# Patient Record
Sex: FEMALE | Race: WHITE | NOT HISPANIC OR LATINO
[De-identification: names, ages, dates, MRNs, and addresses within clinical notes are randomized per-mention and may not be internally consistent; named-entity substitution may affect disease eponyms.]

---

## 2017-04-28 ENCOUNTER — APPOINTMENT (OUTPATIENT)
Dept: ORTHOPEDIC SURGERY | Facility: CLINIC | Age: 77
End: 2017-04-28

## 2017-04-28 VITALS
HEIGHT: 64 IN | BODY MASS INDEX: 27.66 KG/M2 | WEIGHT: 162 LBS | SYSTOLIC BLOOD PRESSURE: 115 MMHG | DIASTOLIC BLOOD PRESSURE: 76 MMHG | HEART RATE: 93 BPM

## 2017-04-28 RX ORDER — DICLOFENAC SODIUM 75 MG/1
75 TABLET, DELAYED RELEASE ORAL TWICE DAILY
Qty: 60 | Refills: 0 | Status: ACTIVE | COMMUNITY
Start: 2017-04-28 | End: 1900-01-01

## 2017-05-31 ENCOUNTER — APPOINTMENT (OUTPATIENT)
Dept: ORTHOPEDIC SURGERY | Facility: CLINIC | Age: 77
End: 2017-05-31

## 2017-05-31 RX ORDER — DICLOFENAC SODIUM 50 MG/1
50 TABLET, DELAYED RELEASE ORAL TWICE DAILY
Qty: 60 | Refills: 0 | Status: ACTIVE | COMMUNITY
Start: 2017-05-31 | End: 1900-01-01

## 2017-07-05 ENCOUNTER — APPOINTMENT (OUTPATIENT)
Dept: ORTHOPEDIC SURGERY | Facility: CLINIC | Age: 77
End: 2017-07-05

## 2017-08-10 ENCOUNTER — APPOINTMENT (OUTPATIENT)
Dept: ORTHOPEDIC SURGERY | Facility: CLINIC | Age: 77
End: 2017-08-10
Payer: MEDICARE

## 2017-08-10 VITALS
WEIGHT: 167 LBS | HEIGHT: 63 IN | DIASTOLIC BLOOD PRESSURE: 78 MMHG | BODY MASS INDEX: 29.59 KG/M2 | HEART RATE: 75 BPM | SYSTOLIC BLOOD PRESSURE: 125 MMHG

## 2017-08-10 PROCEDURE — 73564 X-RAY EXAM KNEE 4 OR MORE: CPT | Mod: 50

## 2017-08-10 PROCEDURE — 99213 OFFICE O/P EST LOW 20 MIN: CPT

## 2017-08-10 RX ORDER — MELOXICAM 15 MG/1
15 TABLET ORAL DAILY
Qty: 30 | Refills: 0 | Status: ACTIVE | COMMUNITY
Start: 2017-08-10 | End: 1900-01-01

## 2017-08-10 RX ORDER — SITAGLIPTIN AND METFORMIN HYDROCHLORIDE 50; 1000 MG/1; MG/1
TABLET, FILM COATED ORAL
Refills: 0 | Status: ACTIVE | COMMUNITY

## 2017-08-10 RX ORDER — CANAGLIFLOZIN 300 MG/1
TABLET, FILM COATED ORAL
Refills: 0 | Status: ACTIVE | COMMUNITY

## 2018-04-18 ENCOUNTER — RESULT REVIEW (OUTPATIENT)
Age: 78
End: 2018-04-18

## 2018-06-22 ENCOUNTER — INPATIENT (INPATIENT)
Facility: HOSPITAL | Age: 78
LOS: 2 days | Discharge: HOME | DRG: 552 | End: 2018-06-25
Attending: INTERNAL MEDICINE | Admitting: INTERNAL MEDICINE
Payer: MEDICARE

## 2018-06-22 VITALS
TEMPERATURE: 98 F | SYSTOLIC BLOOD PRESSURE: 141 MMHG | RESPIRATION RATE: 18 BRPM | OXYGEN SATURATION: 97 % | DIASTOLIC BLOOD PRESSURE: 67 MMHG | HEART RATE: 78 BPM

## 2018-06-22 PROCEDURE — 99285 EMERGENCY DEPT VISIT HI MDM: CPT | Mod: 25

## 2018-06-23 DIAGNOSIS — M54.5 LOW BACK PAIN: ICD-10-CM

## 2018-06-23 LAB
ANION GAP SERPL CALC-SCNC: 17 MMOL/L — SIGNIFICANT CHANGE UP (ref 5–17)
ANION GAP SERPL CALC-SCNC: 17 MMOL/L — SIGNIFICANT CHANGE UP (ref 5–17)
APPEARANCE UR: CLEAR — SIGNIFICANT CHANGE UP
BASOPHILS # BLD AUTO: 0.1 K/UL — SIGNIFICANT CHANGE UP (ref 0–0.2)
BASOPHILS NFR BLD AUTO: 0.4 % — SIGNIFICANT CHANGE UP (ref 0–2)
BILIRUB UR-MCNC: NEGATIVE — SIGNIFICANT CHANGE UP
BUN SERPL-MCNC: 28 MG/DL — HIGH (ref 7–23)
BUN SERPL-MCNC: 30 MG/DL — HIGH (ref 7–23)
CALCIUM SERPL-MCNC: 9.1 MG/DL — SIGNIFICANT CHANGE UP (ref 8.4–10.5)
CALCIUM SERPL-MCNC: 9.5 MG/DL — SIGNIFICANT CHANGE UP (ref 8.4–10.5)
CHLORIDE SERPL-SCNC: 81 MMOL/L — LOW (ref 96–108)
CHLORIDE SERPL-SCNC: 82 MMOL/L — LOW (ref 96–108)
CK MB CFR SERPL CALC: 2.2 NG/ML — SIGNIFICANT CHANGE UP (ref 0–3.8)
CK SERPL-CCNC: 63 U/L — SIGNIFICANT CHANGE UP (ref 25–170)
CO2 SERPL-SCNC: 24 MMOL/L — SIGNIFICANT CHANGE UP (ref 22–31)
CO2 SERPL-SCNC: 24 MMOL/L — SIGNIFICANT CHANGE UP (ref 22–31)
COLOR SPEC: SIGNIFICANT CHANGE UP
CREAT SERPL-MCNC: 0.97 MG/DL — SIGNIFICANT CHANGE UP (ref 0.5–1.3)
CREAT SERPL-MCNC: 0.99 MG/DL — SIGNIFICANT CHANGE UP (ref 0.5–1.3)
DIFF PNL FLD: NEGATIVE — SIGNIFICANT CHANGE UP
EOSINOPHIL # BLD AUTO: 0.3 K/UL — SIGNIFICANT CHANGE UP (ref 0–0.5)
EOSINOPHIL NFR BLD AUTO: 2.5 % — SIGNIFICANT CHANGE UP (ref 0–6)
ERYTHROCYTE [SEDIMENTATION RATE] IN BLOOD: 44 MM/HR — HIGH (ref 0–20)
GLUCOSE BLDC GLUCOMTR-MCNC: 109 MG/DL — HIGH (ref 70–99)
GLUCOSE BLDC GLUCOMTR-MCNC: 124 MG/DL — HIGH (ref 70–99)
GLUCOSE BLDC GLUCOMTR-MCNC: 186 MG/DL — HIGH (ref 70–99)
GLUCOSE BLDC GLUCOMTR-MCNC: 217 MG/DL — HIGH (ref 70–99)
GLUCOSE SERPL-MCNC: 131 MG/DL — HIGH (ref 70–99)
GLUCOSE SERPL-MCNC: 194 MG/DL — HIGH (ref 70–99)
GLUCOSE UR QL: 1000 MG/DL
HCT VFR BLD CALC: 36.7 % — SIGNIFICANT CHANGE UP (ref 34.5–45)
HCT VFR BLD CALC: 39.7 % — SIGNIFICANT CHANGE UP (ref 34.5–45)
HGB BLD-MCNC: 12.7 G/DL — SIGNIFICANT CHANGE UP (ref 11.5–15.5)
HGB BLD-MCNC: 13.5 G/DL — SIGNIFICANT CHANGE UP (ref 11.5–15.5)
KETONES UR-MCNC: NEGATIVE — SIGNIFICANT CHANGE UP
LEUKOCYTE ESTERASE UR-ACNC: ABNORMAL
LYMPHOCYTES # BLD AUTO: 35.8 % — SIGNIFICANT CHANGE UP (ref 13–44)
LYMPHOCYTES # BLD AUTO: 4.5 K/UL — HIGH (ref 1–3.3)
MCHC RBC-ENTMCNC: 30.7 PG — SIGNIFICANT CHANGE UP (ref 27–34)
MCHC RBC-ENTMCNC: 31.1 PG — SIGNIFICANT CHANGE UP (ref 27–34)
MCHC RBC-ENTMCNC: 33.9 GM/DL — SIGNIFICANT CHANGE UP (ref 32–36)
MCHC RBC-ENTMCNC: 34.5 GM/DL — SIGNIFICANT CHANGE UP (ref 32–36)
MCV RBC AUTO: 90.1 FL — SIGNIFICANT CHANGE UP (ref 80–100)
MCV RBC AUTO: 90.6 FL — SIGNIFICANT CHANGE UP (ref 80–100)
MONOCYTES # BLD AUTO: 1 K/UL — HIGH (ref 0–0.9)
MONOCYTES NFR BLD AUTO: 7.9 % — SIGNIFICANT CHANGE UP (ref 2–14)
NEUTROPHILS # BLD AUTO: 6.7 K/UL — SIGNIFICANT CHANGE UP (ref 1.8–7.4)
NEUTROPHILS NFR BLD AUTO: 53.4 % — SIGNIFICANT CHANGE UP (ref 43–77)
NITRITE UR-MCNC: NEGATIVE — SIGNIFICANT CHANGE UP
PH UR: 6 — SIGNIFICANT CHANGE UP (ref 5–8)
PLATELET # BLD AUTO: 279 K/UL — SIGNIFICANT CHANGE UP (ref 150–400)
PLATELET # BLD AUTO: 344 K/UL — SIGNIFICANT CHANGE UP (ref 150–400)
POTASSIUM SERPL-MCNC: 4.9 MMOL/L — SIGNIFICANT CHANGE UP (ref 3.5–5.3)
POTASSIUM SERPL-MCNC: 5 MMOL/L — SIGNIFICANT CHANGE UP (ref 3.5–5.3)
POTASSIUM SERPL-SCNC: 4.9 MMOL/L — SIGNIFICANT CHANGE UP (ref 3.5–5.3)
POTASSIUM SERPL-SCNC: 5 MMOL/L — SIGNIFICANT CHANGE UP (ref 3.5–5.3)
PROT UR-MCNC: NEGATIVE — SIGNIFICANT CHANGE UP
RBC # BLD: 4.07 M/UL — SIGNIFICANT CHANGE UP (ref 3.8–5.2)
RBC # BLD: 4.38 M/UL — SIGNIFICANT CHANGE UP (ref 3.8–5.2)
RBC # FLD: 13.1 % — SIGNIFICANT CHANGE UP (ref 10.3–14.5)
RBC # FLD: 13.4 % — SIGNIFICANT CHANGE UP (ref 10.3–14.5)
SODIUM SERPL-SCNC: 122 MMOL/L — LOW (ref 135–145)
SODIUM SERPL-SCNC: 123 MMOL/L — LOW (ref 135–145)
SP GR SPEC: 1.01 — SIGNIFICANT CHANGE UP (ref 1.01–1.02)
TROPONIN T, HIGH SENSITIVITY RESULT: 11 NG/L — SIGNIFICANT CHANGE UP (ref 0–51)
UROBILINOGEN FLD QL: NEGATIVE — SIGNIFICANT CHANGE UP
WBC # BLD: 12.6 K/UL — HIGH (ref 3.8–10.5)
WBC # BLD: 8.2 K/UL — SIGNIFICANT CHANGE UP (ref 3.8–10.5)
WBC # FLD AUTO: 12.6 K/UL — HIGH (ref 3.8–10.5)
WBC # FLD AUTO: 8.2 K/UL — SIGNIFICANT CHANGE UP (ref 3.8–10.5)

## 2018-06-23 RX ORDER — DIAZEPAM 5 MG
5 TABLET ORAL ONCE
Qty: 0 | Refills: 0 | Status: DISCONTINUED | OUTPATIENT
Start: 2018-06-23 | End: 2018-06-23

## 2018-06-23 RX ORDER — OXYCODONE HYDROCHLORIDE 5 MG/1
5 TABLET ORAL ONCE
Qty: 0 | Refills: 0 | Status: DISCONTINUED | OUTPATIENT
Start: 2018-06-23 | End: 2018-06-23

## 2018-06-23 RX ORDER — LIDOCAINE 4 G/100G
1 CREAM TOPICAL ONCE
Qty: 0 | Refills: 0 | Status: COMPLETED | OUTPATIENT
Start: 2018-06-23 | End: 2018-06-23

## 2018-06-23 RX ORDER — OXYCODONE AND ACETAMINOPHEN 5; 325 MG/1; MG/1
1 TABLET ORAL EVERY 8 HOURS
Qty: 0 | Refills: 0 | Status: DISCONTINUED | OUTPATIENT
Start: 2018-06-23 | End: 2018-06-25

## 2018-06-23 RX ORDER — SIMVASTATIN 20 MG/1
1 TABLET, FILM COATED ORAL
Qty: 0 | Refills: 0 | COMMUNITY

## 2018-06-23 RX ORDER — SIMVASTATIN 20 MG/1
10 TABLET, FILM COATED ORAL AT BEDTIME
Qty: 0 | Refills: 0 | Status: DISCONTINUED | OUTPATIENT
Start: 2018-06-23 | End: 2018-06-23

## 2018-06-23 RX ORDER — DEXTROSE 50 % IN WATER 50 %
25 SYRINGE (ML) INTRAVENOUS ONCE
Qty: 0 | Refills: 0 | Status: DISCONTINUED | OUTPATIENT
Start: 2018-06-23 | End: 2018-06-25

## 2018-06-23 RX ORDER — ENOXAPARIN SODIUM 100 MG/ML
40 INJECTION SUBCUTANEOUS DAILY
Qty: 0 | Refills: 0 | Status: DISCONTINUED | OUTPATIENT
Start: 2018-06-23 | End: 2018-06-25

## 2018-06-23 RX ORDER — INSULIN LISPRO 100/ML
VIAL (ML) SUBCUTANEOUS
Qty: 0 | Refills: 0 | Status: DISCONTINUED | OUTPATIENT
Start: 2018-06-23 | End: 2018-06-25

## 2018-06-23 RX ORDER — MORPHINE SULFATE 50 MG/1
4 CAPSULE, EXTENDED RELEASE ORAL ONCE
Qty: 0 | Refills: 0 | Status: DISCONTINUED | OUTPATIENT
Start: 2018-06-23 | End: 2018-06-23

## 2018-06-23 RX ORDER — SITAGLIPTIN 50 MG/1
0.5 TABLET, FILM COATED ORAL
Qty: 0 | Refills: 0 | COMMUNITY

## 2018-06-23 RX ORDER — SODIUM CHLORIDE 9 MG/ML
1000 INJECTION, SOLUTION INTRAVENOUS
Qty: 0 | Refills: 0 | Status: DISCONTINUED | OUTPATIENT
Start: 2018-06-23 | End: 2018-06-25

## 2018-06-23 RX ORDER — SIMVASTATIN 20 MG/1
40 TABLET, FILM COATED ORAL AT BEDTIME
Qty: 0 | Refills: 0 | Status: DISCONTINUED | OUTPATIENT
Start: 2018-06-23 | End: 2018-06-25

## 2018-06-23 RX ORDER — GABAPENTIN 400 MG/1
100 CAPSULE ORAL DAILY
Qty: 0 | Refills: 0 | Status: DISCONTINUED | OUTPATIENT
Start: 2018-06-23 | End: 2018-06-25

## 2018-06-23 RX ORDER — METFORMIN HYDROCHLORIDE 850 MG/1
1 TABLET ORAL
Qty: 0 | Refills: 0 | COMMUNITY

## 2018-06-23 RX ORDER — DEXTROSE 50 % IN WATER 50 %
12.5 SYRINGE (ML) INTRAVENOUS ONCE
Qty: 0 | Refills: 0 | Status: DISCONTINUED | OUTPATIENT
Start: 2018-06-23 | End: 2018-06-25

## 2018-06-23 RX ORDER — GABAPENTIN 400 MG/1
0 CAPSULE ORAL
Qty: 0 | Refills: 0 | COMMUNITY

## 2018-06-23 RX ORDER — ASPIRIN/CALCIUM CARB/MAGNESIUM 324 MG
81 TABLET ORAL DAILY
Qty: 0 | Refills: 0 | Status: DISCONTINUED | OUTPATIENT
Start: 2018-06-23 | End: 2018-06-25

## 2018-06-23 RX ORDER — LISINOPRIL 2.5 MG/1
2.5 TABLET ORAL DAILY
Qty: 0 | Refills: 0 | Status: DISCONTINUED | OUTPATIENT
Start: 2018-06-23 | End: 2018-06-25

## 2018-06-23 RX ORDER — DEXTROSE 50 % IN WATER 50 %
15 SYRINGE (ML) INTRAVENOUS ONCE
Qty: 0 | Refills: 0 | Status: DISCONTINUED | OUTPATIENT
Start: 2018-06-23 | End: 2018-06-25

## 2018-06-23 RX ORDER — GLUCAGON INJECTION, SOLUTION 0.5 MG/.1ML
1 INJECTION, SOLUTION SUBCUTANEOUS ONCE
Qty: 0 | Refills: 0 | Status: DISCONTINUED | OUTPATIENT
Start: 2018-06-23 | End: 2018-06-25

## 2018-06-23 RX ORDER — METHIMAZOLE 10 MG/1
1 TABLET ORAL
Qty: 0 | Refills: 0 | COMMUNITY

## 2018-06-23 RX ORDER — SODIUM CHLORIDE 9 MG/ML
1000 INJECTION INTRAMUSCULAR; INTRAVENOUS; SUBCUTANEOUS
Qty: 0 | Refills: 0 | Status: DISCONTINUED | OUTPATIENT
Start: 2018-06-23 | End: 2018-06-25

## 2018-06-23 RX ORDER — KETOROLAC TROMETHAMINE 30 MG/ML
15 SYRINGE (ML) INJECTION ONCE
Qty: 0 | Refills: 0 | Status: DISCONTINUED | OUTPATIENT
Start: 2018-06-23 | End: 2018-06-23

## 2018-06-23 RX ORDER — OXYCODONE AND ACETAMINOPHEN 5; 325 MG/1; MG/1
2 TABLET ORAL EVERY 8 HOURS
Qty: 0 | Refills: 0 | Status: DISCONTINUED | OUTPATIENT
Start: 2018-06-23 | End: 2018-06-25

## 2018-06-23 RX ORDER — CYCLOBENZAPRINE HYDROCHLORIDE 10 MG/1
5 TABLET, FILM COATED ORAL
Qty: 0 | Refills: 0 | Status: DISCONTINUED | OUTPATIENT
Start: 2018-06-23 | End: 2018-06-25

## 2018-06-23 RX ADMIN — Medication 81 MILLIGRAM(S): at 12:38

## 2018-06-23 RX ADMIN — OXYCODONE AND ACETAMINOPHEN 2 TABLET(S): 5; 325 TABLET ORAL at 23:52

## 2018-06-23 RX ADMIN — Medication 5 MILLIGRAM(S): at 01:16

## 2018-06-23 RX ADMIN — OXYCODONE HYDROCHLORIDE 5 MILLIGRAM(S): 5 TABLET ORAL at 01:16

## 2018-06-23 RX ADMIN — OXYCODONE HYDROCHLORIDE 5 MILLIGRAM(S): 5 TABLET ORAL at 05:35

## 2018-06-23 RX ADMIN — MORPHINE SULFATE 4 MILLIGRAM(S): 50 CAPSULE, EXTENDED RELEASE ORAL at 05:55

## 2018-06-23 RX ADMIN — ENOXAPARIN SODIUM 40 MILLIGRAM(S): 100 INJECTION SUBCUTANEOUS at 12:38

## 2018-06-23 RX ADMIN — SODIUM CHLORIDE 75 MILLILITER(S): 9 INJECTION INTRAMUSCULAR; INTRAVENOUS; SUBCUTANEOUS at 10:24

## 2018-06-23 RX ADMIN — GABAPENTIN 100 MILLIGRAM(S): 400 CAPSULE ORAL at 12:38

## 2018-06-23 RX ADMIN — CYCLOBENZAPRINE HYDROCHLORIDE 5 MILLIGRAM(S): 10 TABLET, FILM COATED ORAL at 22:52

## 2018-06-23 RX ADMIN — LISINOPRIL 2.5 MILLIGRAM(S): 2.5 TABLET ORAL at 12:38

## 2018-06-23 RX ADMIN — LIDOCAINE 1 PATCH: 4 CREAM TOPICAL at 17:35

## 2018-06-23 RX ADMIN — SIMVASTATIN 40 MILLIGRAM(S): 20 TABLET, FILM COATED ORAL at 21:29

## 2018-06-23 RX ADMIN — Medication 15 MILLIGRAM(S): at 05:55

## 2018-06-23 RX ADMIN — Medication 1: at 13:14

## 2018-06-23 RX ADMIN — LIDOCAINE 1 PATCH: 4 CREAM TOPICAL at 01:16

## 2018-06-23 NOTE — CONSULT NOTE ADULT - SUBJECTIVE AND OBJECTIVE BOX
HPI:   Patient is a 78y female with a history of DM,HTN,and HLD who was admitted for evaluation of lower back pain radiating down the left leg.She has had difficulty ambulating, denies any bowel or bladder symptoms.No fever or chills, no back trauma.She did not respond to NSAI's, also found to have a low sodium of 122.    REVIEW OF SYSTEMS:  All other review of systems negative (Comprehensive ROS)    PAST MEDICAL & SURGICAL HISTORY:  DM2 (diabetes mellitus, type 2)  HLD (hyperlipidemia)  HTN (hypertension)      Allergies    No Known Allergies    Intolerances        Antimicrobials Day #  :    Other Medications:  aspirin enteric coated 81 milliGRAM(s) Oral daily  cyclobenzaprine 5 milliGRAM(s) Oral two times a day PRN  dextrose 40% Gel 15 Gram(s) Oral once PRN  dextrose 5%. 1000 milliLiter(s) IV Continuous <Continuous>  dextrose 50% Injectable 12.5 Gram(s) IV Push once  dextrose 50% Injectable 25 Gram(s) IV Push once  dextrose 50% Injectable 25 Gram(s) IV Push once  enoxaparin Injectable 40 milliGRAM(s) SubCutaneous daily  glucagon  Injectable 1 milliGRAM(s) IntraMuscular once PRN  insulin lispro (HumaLOG) corrective regimen sliding scale   SubCutaneous three times a day before meals  lisinopril 2.5 milliGRAM(s) Oral daily  oxyCODONE    5 mG/acetaminophen 325 mG 1 Tablet(s) Oral every 8 hours PRN  oxyCODONE    5 mG/acetaminophen 325 mG 2 Tablet(s) Oral every 8 hours PRN  simvastatin 10 milliGRAM(s) Oral at bedtime  sodium chloride 0.9%. 1000 milliLiter(s) IV Continuous <Continuous>      FAMILY HISTORY:      SOCIAL HISTORY:  Smoking: x    ETOH:x     Drug Use: x    T(F): 98.2 (06-23-18 @ 08:04), Max: 98.2 (06-23-18 @ 08:04)  HR: 76 (06-23-18 @ 08:04)  BP: 130/76 (06-23-18 @ 08:04)  RR: 20 (06-23-18 @ 08:04)  SpO2: 98% (06-23-18 @ 08:04)  Wt(kg): --    PHYSICAL EXAM:  General: alert, no acute distress  Eyes:  anicteric, no conjunctival injection, no discharge  Oropharynx: no lesions or injection 	  Neck: supple, without adenopathy  Lungs: clear to auscultation  Heart: regular rate and rhythm; no murmur, rubs or gallops  Abdomen: soft, nondistended, nontender, without mass or organomegaly  Skin: no lesions  Extremities: no clubbing, cyanosis, or edema  Neurologic: alert, oriented, moves all extremities    LAB RESULTS:                        13.5   12.6  )-----------( 344      ( 23 Jun 2018 05:52 )             39.7     06-23    122<L>  |  81<L>  |  28<H>  ----------------------------<  131<H>  4.9   |  24  |  0.97    Ca    9.5      23 Jun 2018 05:52            MICROBIOLOGY:  RECENT CULTURES:        RADIOLOGY REVIEWED:

## 2018-06-23 NOTE — ED PROVIDER NOTE - OBJECTIVE STATEMENT
78yof w/ HTN, HLD, DM2 p/w atraumatic low back pain. Gradual onset of pain in the left lower back with no inciting event or trauma worsening over the past week. Dull, aching pain, radiates down the left leg, worse w/ leg movements and ambulation, now at the point where she can barely ambulate. Has been taking diclofenac w/ minimal relief. Known hx of lumbar disc disease. No fevers, chills, or other systemic symptoms. No midline back pain, saddle anesthesia, bowel/bladder dysfunction.

## 2018-06-23 NOTE — CONSULT NOTE ADULT - ASSESSMENT
79 yo female with history of DM admitted for treatment of lower back pain with left leg radiation, noted to be hyponatremic with a mild leukocytosis.  No constitutional symptoms to support infection.  Likely mechanical issues with back as apposed to infection.  She looks non toxic and not toxic or septic.  Suggest:  1.no antibiotics  2.blood culture any fever spikes or if she develops a rising wbc count  3.Will defer to primary team on spine evaluation  4.CRP in am, baseline inflammatory marker

## 2018-06-23 NOTE — H&P ADULT - ASSESSMENT
pt  with htn,  dm 2,  admitted with back  pain, radiating down laft   leg   no motor  weakness   pt  eval   high wbc, awaiting u/a, afebrile, no  cp/.sob/.a bd  pain/ dysuria  hyponatremia   hold  diuretics   bmp in am

## 2018-06-23 NOTE — ED PROVIDER NOTE - PROGRESS NOTE DETAILS
Todd: Minimal improvement w/ oxycodone, valium, still unable to ambulate w/o significant assistance. Discussed w/ Dr Acuna, will admit pt to Dr. Loya for pain control and further work up.

## 2018-06-23 NOTE — CONSULT NOTE ADULT - SUBJECTIVE AND OBJECTIVE BOX
Patient is a 78y Female who presents c/o left sided sciatica. Pain began  and worsened . Patient can stand but pain exacerbated with walking. Does not usually require assistive devices but now using walker in hospital. Denies HS/LOC. Denies pain/injury elsewhere. Recounts intermittent spasms LLE. Sees Dr. Huang in office for known MRI findings showing severe facet arthrosis with spondylolisthesis with left sided disc impinging upon L5 nerve root. Denies bowel/bladder incontinence. Denies fevers/chills. No other complaints at this time.     HEALTH ISSUES - PROBLEM Dx:    MEDICATIONS  (STANDING):  aspirin enteric coated 81 milliGRAM(s) Oral daily  dextrose 5%. 1000 milliLiter(s) IV Continuous <Continuous>  dextrose 50% Injectable 12.5 Gram(s) IV Push once  dextrose 50% Injectable 25 Gram(s) IV Push once  dextrose 50% Injectable 25 Gram(s) IV Push once  enoxaparin Injectable 40 milliGRAM(s) SubCutaneous daily  gabapentin 100 milliGRAM(s) Oral daily  insulin lispro (HumaLOG) corrective regimen sliding scale   SubCutaneous three times a day before meals  lisinopril 2.5 milliGRAM(s) Oral daily  methimazole 5 milliGRAM(s) Oral daily  simvastatin 40 milliGRAM(s) Oral at bedtime  sodium chloride 0.9%. 1000 milliLiter(s) IV Continuous <Continuous>      Allergies    No Known Allergies    Intolerances        PAST MEDICAL & SURGICAL HISTORY:  DM2 (diabetes mellitus, type 2)  HLD (hyperlipidemia)  HTN (hypertension)                            13.5   12.6  )-----------( 344      ( 2018 05:52 )             39.7       2018 10:59    123    |  82     |  30     ----------------------------<  194    5.0     |  24     |  0.99     Ca    9.1        2018 10:59            Urinalysis Basic - ( 2018 12:46 )    Color: PL Yellow / Appearance: Clear / S.011 / pH: x  Gluc: x / Ketone: Negative  / Bili: Negative / Urobili: Negative   Blood: x / Protein: Negative / Nitrite: Negative   Leuk Esterase: Small / RBC: 0-2 /HPF / WBC 2-5 /HPF   Sq Epi: x / Non Sq Epi: Occasional /HPF / Bacteria: x        Vital Signs Last 24 Hrs  T(C): 36.4 (18 @ 12:16), Max: 36.8 (18 @ 08:04)  T(F): 97.5 (18 @ 12:16), Max: 98.2 (18 @ 08:04)  HR: 75 (18 @ 12:16) (65 - 78)  BP: 140/67 (18 @ 12:16) (128/68 - 141/67)  BP(mean): --  RR: 18 (18 @ 12:16) (18 - 20)  SpO2: 100% (18 @ 12:16) (97% - 100%)    Gen: NAD    Spine PE:  Skin intact  No gross deformity  Tender to palpation left SI joint with pain patch over  No midline TTP C/T/L/S spine  No bony step offs  No paraspinal muscle ttp/hypertonicity   Negative clonus  Negative davis  + rectal tone  No saddle anesthesia    Motor:                   C5                C6              C7               C8           T1   R            5/5                5/5            5/5             5/5          5/5  L             5/5               5/5             5/5             5/5          5/5                L2             L3             L4               L5            S1  R         5/5           5/5          5/5             5/5           5/5  L          5/5          5/5           5/5             5/5           5/5    Sensory:            C5         C6         C7      C8       T1        (0=absent, 1=impaired, 2=normal, NT=not testable)  R         2            2           2        2         2  L          2            2           2        2         2               L2          L3         L4      L5       S1         (0=absent, 1=impaired, 2=normal, NT=not testable)  R         2            2            2        2        2  L          2            2           2        2         2    Imaging: No imaging    A/P: 78y Female with low back pain and mild leukocytosis, non-toxic appearing  - pain control  - MRI lumbar spine  - will discuss with attending Patient is a 78y Female who presents c/o left sided sciatica. Pain began  and worsened . Patient can stand but pain exacerbated with walking. Does not usually require assistive devices but now using walker in hospital. Denies HS/LOC. Denies pain/injury elsewhere. Recounts intermittent spasms LLE. Sees Dr. Huang in office for known MRI findings showing severe facet arthrosis with spondylolisthesis with left sided disc impinging upon L5 nerve root. Denies bowel/bladder incontinence. Denies fevers/chills. No other complaints at this time.     HEALTH ISSUES - PROBLEM Dx:    MEDICATIONS  (STANDING):  aspirin enteric coated 81 milliGRAM(s) Oral daily  dextrose 5%. 1000 milliLiter(s) IV Continuous <Continuous>  dextrose 50% Injectable 12.5 Gram(s) IV Push once  dextrose 50% Injectable 25 Gram(s) IV Push once  dextrose 50% Injectable 25 Gram(s) IV Push once  enoxaparin Injectable 40 milliGRAM(s) SubCutaneous daily  gabapentin 100 milliGRAM(s) Oral daily  insulin lispro (HumaLOG) corrective regimen sliding scale   SubCutaneous three times a day before meals  lisinopril 2.5 milliGRAM(s) Oral daily  methimazole 5 milliGRAM(s) Oral daily  simvastatin 40 milliGRAM(s) Oral at bedtime  sodium chloride 0.9%. 1000 milliLiter(s) IV Continuous <Continuous>      Allergies    No Known Allergies    Intolerances        PAST MEDICAL & SURGICAL HISTORY:  DM2 (diabetes mellitus, type 2)  HLD (hyperlipidemia)  HTN (hypertension)                            13.5   12.6  )-----------( 344      ( 2018 05:52 )             39.7       2018 10:59    123    |  82     |  30     ----------------------------<  194    5.0     |  24     |  0.99     Ca    9.1        2018 10:59            Urinalysis Basic - ( 2018 12:46 )    Color: PL Yellow / Appearance: Clear / S.011 / pH: x  Gluc: x / Ketone: Negative  / Bili: Negative / Urobili: Negative   Blood: x / Protein: Negative / Nitrite: Negative   Leuk Esterase: Small / RBC: 0-2 /HPF / WBC 2-5 /HPF   Sq Epi: x / Non Sq Epi: Occasional /HPF / Bacteria: x        Vital Signs Last 24 Hrs  T(C): 36.4 (18 @ 12:16), Max: 36.8 (18 @ 08:04)  T(F): 97.5 (18 @ 12:16), Max: 98.2 (18 @ 08:04)  HR: 75 (18 @ 12:16) (65 - 78)  BP: 140/67 (18 @ 12:16) (128/68 - 141/67)  BP(mean): --  RR: 18 (18 @ 12:16) (18 - 20)  SpO2: 100% (18 @ 12:16) (97% - 100%)    Gen: NAD    Spine PE:  Skin intact  No gross deformity  Tender to palpation left SI joint with pain patch over  No midline TTP C/T/L/S spine  No bony step offs  No paraspinal muscle ttp/hypertonicity   Negative clonus  Negative davis  + rectal tone  No saddle anesthesia    Motor:                   C5                C6              C7               C8           T1   R            5/5                5/5            5/5             5/5          5/5  L             5/5               5/5             5/5             5/5          5/5                L2             L3             L4               L5            S1  R         5/5           5/5          5/5             5/5           5/5  L          5/5          5/5           5/5             5/5           5/5    Sensory:            C5         C6         C7      C8       T1        (0=absent, 1=impaired, 2=normal, NT=not testable)  R         2            2           2        2         2  L          2            2           2        2         2               L2          L3         L4      L5       S1         (0=absent, 1=impaired, 2=normal, NT=not testable)  R         2            2            2        2        2  L          2            2           2        2         2    Imaging: No imaging    A/P: 78y Female with low back pain and mild leukocytosis, non-toxic appearing  - Anti-inflammatories - diclofenac 50 mg BID  - Xray lumbar spine, no need for MRI imaging  - Discussed with attending Patient is a 78y Female who presents c/o left sided sciatica. Pain began  and worsened . Patient can stand but pain exacerbated with walking. Does not usually require assistive devices but now using walker in hospital. Denies HS/LOC. Denies pain/injury elsewhere. Recounts intermittent spasms LLE. Sees Dr. Huang in office for known MRI findings showing severe facet arthrosis with spondylolisthesis with left sided disc impinging upon L5 nerve root. Denies bowel/bladder incontinence. Denies fevers/chills. No other complaints at this time.     HEALTH ISSUES - PROBLEM Dx:    MEDICATIONS  (STANDING):  aspirin enteric coated 81 milliGRAM(s) Oral daily  dextrose 5%. 1000 milliLiter(s) IV Continuous <Continuous>  dextrose 50% Injectable 12.5 Gram(s) IV Push once  dextrose 50% Injectable 25 Gram(s) IV Push once  dextrose 50% Injectable 25 Gram(s) IV Push once  enoxaparin Injectable 40 milliGRAM(s) SubCutaneous daily  gabapentin 100 milliGRAM(s) Oral daily  insulin lispro (HumaLOG) corrective regimen sliding scale   SubCutaneous three times a day before meals  lisinopril 2.5 milliGRAM(s) Oral daily  methimazole 5 milliGRAM(s) Oral daily  simvastatin 40 milliGRAM(s) Oral at bedtime  sodium chloride 0.9%. 1000 milliLiter(s) IV Continuous <Continuous>      Allergies    No Known Allergies    Intolerances        PAST MEDICAL & SURGICAL HISTORY:  DM2 (diabetes mellitus, type 2)  HLD (hyperlipidemia)  HTN (hypertension)                            13.5   12.6  )-----------( 344      ( 2018 05:52 )             39.7       2018 10:59    123    |  82     |  30     ----------------------------<  194    5.0     |  24     |  0.99     Ca    9.1        2018 10:59            Urinalysis Basic - ( 2018 12:46 )    Color: PL Yellow / Appearance: Clear / S.011 / pH: x  Gluc: x / Ketone: Negative  / Bili: Negative / Urobili: Negative   Blood: x / Protein: Negative / Nitrite: Negative   Leuk Esterase: Small / RBC: 0-2 /HPF / WBC 2-5 /HPF   Sq Epi: x / Non Sq Epi: Occasional /HPF / Bacteria: x        Vital Signs Last 24 Hrs  T(C): 36.4 (18 @ 12:16), Max: 36.8 (18 @ 08:04)  T(F): 97.5 (18 @ 12:16), Max: 98.2 (18 @ 08:04)  HR: 75 (18 @ 12:16) (65 - 78)  BP: 140/67 (18 @ 12:16) (128/68 - 141/67)  BP(mean): --  RR: 18 (18 @ 12:16) (18 - 20)  SpO2: 100% (18 @ 12:16) (97% - 100%)    Gen: NAD    Spine PE:  Skin intact  No gross deformity  Tender to palpation left SI joint with pain patch over  No midline TTP C/T/L/S spine  No bony step offs  No paraspinal muscle ttp/hypertonicity   Negative clonus  Negative davis  + rectal tone  No saddle anesthesia    Motor:                   C5                C6              C7               C8           T1   R            5/5                5/5            5/5             5/5          5/5  L             5/5               5/5             5/5             5/5          5/5                L2             L3             L4               L5            S1  R         5/5           5/5          5/5             5/5           5/5  L          5/5          5/5           5/5             5/5           5/5    Sensory:            C5         C6         C7      C8       T1        (0=absent, 1=impaired, 2=normal, NT=not testable)  R         2            2           2        2         2  L          2            2           2        2         2               L2          L3         L4      L5       S1         (0=absent, 1=impaired, 2=normal, NT=not testable)  R         2            2            2        2        2  L          2            2           2        2         2    Imaging: No imaging    A/P: 78y Female with low back pain and mild leukocytosis, non-toxic appearing, no spine hardware.  - Anti-inflammatories - diclofenac 50 mg BID  - Xray lumbar spine, no need for MRI imaging  - Discussed with attending

## 2018-06-23 NOTE — ED ADULT NURSE NOTE - OBJECTIVE STATEMENT
Pt presents to the ED with complaint of back pain. Pt AXOX3 with son at bedside. Pt reports chronic back pain, being seen outpt, tx with home Rx NSAIDs. Pt reports pain worsening yesterday, rpeorts difficulty laying flat, difficulty ambulating, utilizing 2 canes at home per son, Pt denies trauma or fall, pain is bilateral and radiating to legs. Reports chronic numbness to hands and feet. Endorses chill today at home, no fever. Breathing unlabored, symmetrical, no shortness of breath skins color normal for age and race, no chest pain.  Abdomen nondistended, no nausea vomiting or diarrhea, no dysuria or hematuria.

## 2018-06-23 NOTE — H&P ADULT - NSHPPHYSICALEXAM_GEN_ALL_CORE
PHYSICAL EXAMINATION:  Vital Signs Last 24 Hrs  T(C): 36.4 (23 Jun 2018 05:15), Max: 36.7 (22 Jun 2018 23:50)  T(F): 97.6 (23 Jun 2018 05:15), Max: 98.1 (22 Jun 2018 23:50)  HR: 70 (23 Jun 2018 06:05) (65 - 78)  BP: 136/62 (23 Jun 2018 06:05) (128/68 - 141/67)  BP(mean): --  RR: 18 (23 Jun 2018 06:05) (18 - 18)  SpO2: 100% (23 Jun 2018 06:05) (97% - 100%)  CAPILLARY BLOOD GLUCOSE            GENERAL: NAD, well-groomed,  HEAD:  atraumatic, normocephalic  EYES: sclera anicteric  ENMT: mucous membranes moist  NECK: supple, No JVD  CHEST/LUNG: clear to auscultation bilaterally;    no      rales   ,   no rhonchi,   HEART: normal S1, S2  ABDOMEN: BS+, soft, ND, NT   EXTREMITIES:    no    edema    b/l LEs  NEURO: awake, ,     moves all extremities  legs. 5/5 motor,  no  sensory loss  SKIN: no     rash

## 2018-06-23 NOTE — CONSULT NOTE ADULT - SUBJECTIVE AND OBJECTIVE BOX
Patient is a 78 old  Female who presents with a chief complaint of back pain (2018 07:26)      HPI:  International Travel? No(1)    78y Female complaining of back pain general.	  78yof w/ HTN, HLD, DM2 p/w atraumatic low back pain . Gradual onset of pain in the left lower back with no inciting event or trauma worsening over the past week. Dull, aching pain, radiates down the left leg, worse w/ leg movements and ambulation, now at the point where she can barely ambulate. Has been taking diclofenac w/ minimal relief. Known hx of lumbar disc disease. No fevers, chills, or other systemic symptoms. No midline back pain, saddle anesthesia, bowel/bladder dysfunction. (2018 07:26)           *****PAST MEDICAL / Surgical  HISTORY:  PAST MEDICAL & SURGICAL HISTORY:  DM2 (diabetes mellitus, type 2)  HLD (hyperlipidemia)  HTN (hypertension)           *****FAMILY HISTORY:  FAMILY HISTORY:           *****SOCIAL HISTORY:  Alcohol: None  Smoking: None         *****ALLERGIES:   Allergies    No Known Allergies    Intolerances             *****MEDICATIONS:  MEDICATIONS  (STANDING):  aspirin enteric coated 81 milliGRAM(s) Oral daily  dextrose 5%. 1000 milliLiter(s) (50 mL/Hr) IV Continuous <Continuous>  dextrose 50% Injectable 12.5 Gram(s) IV Push once  dextrose 50% Injectable 25 Gram(s) IV Push once  dextrose 50% Injectable 25 Gram(s) IV Push once  enoxaparin Injectable 40 milliGRAM(s) SubCutaneous daily  gabapentin 100 milliGRAM(s) Oral daily  insulin lispro (HumaLOG) corrective regimen sliding scale   SubCutaneous three times a day before meals  lisinopril 2.5 milliGRAM(s) Oral daily  methimazole 5 milliGRAM(s) Oral daily  simvastatin 40 milliGRAM(s) Oral at bedtime  sodium chloride 0.9%. 1000 milliLiter(s) (75 mL/Hr) IV Continuous <Continuous>    MEDICATIONS  (PRN):  cyclobenzaprine 5 milliGRAM(s) Oral two times a day PRN Muscle Spasm  dextrose 40% Gel 15 Gram(s) Oral once PRN Blood Glucose LESS THAN 70 milliGRAM(s)/deciliter  glucagon  Injectable 1 milliGRAM(s) IntraMuscular once PRN Glucose LESS THAN 70 milligrams/deciliter  oxyCODONE    5 mG/acetaminophen 325 mG 1 Tablet(s) Oral every 8 hours PRN Moderate Pain (4 - 6)  oxyCODONE    5 mG/acetaminophen 325 mG 2 Tablet(s) Oral every 8 hours PRN Severe Pain (7 - 10)           *****REVIEW OF SYSTEM:  GEN: no fever, no chills, no pain  RESP: no SOB, no cough, no sputum  CVS: no chest pain, no palpitations, no edema  GI: no abdominal pain, no nausea, no vomiting, no constipation, no diarrhea  : no dysurea, no frequency, no hematurea  Neuro: no headache, no dizziness  PSYCH: no anxiety, no depression  Derm : no itching, no rash         *****VITAL SIGNS:  T(C): 36.4 (18 @ 12:16), Max: 36.8 (18 @ 08:04)  HR: 75 (18 @ 12:16) (65 - 78)  BP: 140/67 (18 @ 12:16) (128/68 - 141/67)  RR: 18 (18 @ 12:16) (18 - 20)  SpO2: 100% (18 @ 12:16) (97% - 100%)  Wt(kg): --     @ 07:01  -   @ 14:26  --------------------------------------------------------  IN: 360 mL / OUT: 200 mL / NET: 160 mL             *****PHYSICAL EXAM:  GEN: A&O X 3 , NAD , comfortable  HEENT: NCAT, PERRL, MMM, hearing intact  Neck: supple , no JVD  CVS: S1S2 , regular , No M/R/G appreciated  PULM: CTA B/L,  no W/R/R appreciated  ABD.: soft. non tender, non distended,  bowel sounds present  Extrem: intact pulses , no edema   Derm: No rash , no ecchymoses  PSYCH : normal mood,  no delusion not anxious         *****LAB AND IMAGIN.5   12.6  )-----------( 344      ( 2018 05:52 )             39.7               06-23    123<L>  |  82<L>  |  30<H>  ----------------------------<  194<H>  5.0   |  24  |  0.99    Ca    9.1      2018 10:59                  CARDIAC MARKERS ( 2018 10:59 )  x     / x     / 63 U/L / x     / 2.2 ng/mL              Urinalysis Basic - ( 2018 12:46 )    Color: PL Yellow / Appearance: Clear / S.011 / pH: x  Gluc: x / Ketone: Negative  / Bili: Negative / Urobili: Negative   Blood: x / Protein: Negative / Nitrite: Negative   Leuk Esterase: Small / RBC: 0-2 /HPF / WBC 2-5 /HPF   Sq Epi: x / Non Sq Epi: Occasional /HPF / Bacteria: x        [All pertinent recent Imaging reports reviewed]         *****A S S E S S M E N T   A N D   P L A N :  78F with worsening of chronic LBP, HTN, low NA  ARB held  on IVF  monitor Na  ID eval noted  will monitor BP, Tx as needed    ___________________________  Will follow with you.  Thank you,  ADAM Gonzales D.O.

## 2018-06-23 NOTE — H&P ADULT - HISTORY OF PRESENT ILLNESS
International Travel? No(1)    78y Female complaining of back pain general.	  78yof w/ HTN, HLD, DM2 p/w atraumatic low back pain . Gradual onset of pain in the left lower back with no inciting event or trauma worsening over the past week. Dull, aching pain, radiates down the left leg, worse w/ leg movements and ambulation, now at the point where she can barely ambulate. Has been taking diclofenac w/ minimal relief. Known hx of lumbar disc disease. No fevers, chills, or other systemic symptoms. No midline back pain, saddle anesthesia, bowel/bladder dysfunction.

## 2018-06-23 NOTE — H&P ADULT - NSHPLABSRESULTS_GEN_ALL_CORE
LABS:                        13.5   12.6  )-----------( 344      ( 23 Jun 2018 05:52 )             39.7     06-23    122<L>  |  81<L>  |  28<H>  ----------------------------<  131<H>  4.9   |  24  |  0.97    Ca    9.5      23 Jun 2018 05:52

## 2018-06-23 NOTE — ED PROVIDER NOTE - MEDICAL DECISION MAKING DETAILS
78yof w/ atraumatic low back pain, no signs or symptoms of cord compression, no systemic symptoms, likely sciatic pain related to known disc disease. Analgesia and reassess. If unable to ambulate may need admission for pain control.

## 2018-06-23 NOTE — ED PROVIDER NOTE - MUSCULOSKELETAL, MLM
No midline spinal tenderness or step offs, left paraspinal tenderness, pain w/ left straight leg raise at 15 degrees

## 2018-06-23 NOTE — ED PROVIDER NOTE - ATTENDING CONTRIBUTION TO CARE
attending Flaquita: 78yF h/o HTN, HLD, DM2, lumbar disc disease p/w acute on chronic atraumatic low back pain. Gradual onset pain L low back. Dull, aching pain, radiates down the left leg, worse w/ leg movements and ambulation. Now cannot ambulate 2/2 pain. No relief with diclofenac. Denies bowel/bladder dysfunction, h/o cancer, fever, chills, saddle anesthesia, LE weakness. Will administer analgesia and reassess. If unable to ambulate may need admission for pain control.

## 2018-06-24 LAB
ANION GAP SERPL CALC-SCNC: 12 MMOL/L — SIGNIFICANT CHANGE UP (ref 5–17)
BUN SERPL-MCNC: 28 MG/DL — HIGH (ref 7–23)
CALCIUM SERPL-MCNC: 8.5 MG/DL — SIGNIFICANT CHANGE UP (ref 8.4–10.5)
CHLORIDE SERPL-SCNC: 92 MMOL/L — LOW (ref 96–108)
CO2 SERPL-SCNC: 23 MMOL/L — SIGNIFICANT CHANGE UP (ref 22–31)
CREAT SERPL-MCNC: 0.97 MG/DL — SIGNIFICANT CHANGE UP (ref 0.5–1.3)
CRP SERPL-MCNC: 0.2 MG/DL — SIGNIFICANT CHANGE UP (ref 0–0.4)
CRP SERPL-MCNC: 0.3 MG/DL — SIGNIFICANT CHANGE UP (ref 0–0.4)
CULTURE RESULTS: SIGNIFICANT CHANGE UP
GLUCOSE BLDC GLUCOMTR-MCNC: 110 MG/DL — HIGH (ref 70–99)
GLUCOSE BLDC GLUCOMTR-MCNC: 136 MG/DL — HIGH (ref 70–99)
GLUCOSE BLDC GLUCOMTR-MCNC: 139 MG/DL — HIGH (ref 70–99)
GLUCOSE BLDC GLUCOMTR-MCNC: 182 MG/DL — HIGH (ref 70–99)
GLUCOSE SERPL-MCNC: 108 MG/DL — HIGH (ref 70–99)
HBA1C BLD-MCNC: 6.8 % — HIGH (ref 4–5.6)
HCT VFR BLD CALC: 36 % — SIGNIFICANT CHANGE UP (ref 34.5–45)
HGB BLD-MCNC: 12.1 G/DL — SIGNIFICANT CHANGE UP (ref 11.5–15.5)
MCHC RBC-ENTMCNC: 30.8 PG — SIGNIFICANT CHANGE UP (ref 27–34)
MCHC RBC-ENTMCNC: 33.6 GM/DL — SIGNIFICANT CHANGE UP (ref 32–36)
MCV RBC AUTO: 91.8 FL — SIGNIFICANT CHANGE UP (ref 80–100)
PLATELET # BLD AUTO: 157 K/UL — SIGNIFICANT CHANGE UP (ref 150–400)
POTASSIUM SERPL-MCNC: 4.6 MMOL/L — SIGNIFICANT CHANGE UP (ref 3.5–5.3)
POTASSIUM SERPL-SCNC: 4.6 MMOL/L — SIGNIFICANT CHANGE UP (ref 3.5–5.3)
RBC # BLD: 3.92 M/UL — SIGNIFICANT CHANGE UP (ref 3.8–5.2)
RBC # FLD: 13.4 % — SIGNIFICANT CHANGE UP (ref 10.3–14.5)
SODIUM SERPL-SCNC: 127 MMOL/L — LOW (ref 135–145)
SPECIMEN SOURCE: SIGNIFICANT CHANGE UP
WBC # BLD: 8.6 K/UL — SIGNIFICANT CHANGE UP (ref 3.8–10.5)
WBC # FLD AUTO: 8.6 K/UL — SIGNIFICANT CHANGE UP (ref 3.8–10.5)

## 2018-06-24 PROCEDURE — 72100 X-RAY EXAM L-S SPINE 2/3 VWS: CPT | Mod: 26

## 2018-06-24 RX ORDER — TIMOLOL 0.5 %
1 DROPS OPHTHALMIC (EYE)
Qty: 0 | Refills: 0 | Status: DISCONTINUED | OUTPATIENT
Start: 2018-06-24 | End: 2018-06-25

## 2018-06-24 RX ORDER — LIDOCAINE 4 G/100G
1 CREAM TOPICAL DAILY
Qty: 0 | Refills: 0 | Status: DISCONTINUED | OUTPATIENT
Start: 2018-06-24 | End: 2018-06-25

## 2018-06-24 RX ORDER — INSULIN LISPRO 100/ML
VIAL (ML) SUBCUTANEOUS AT BEDTIME
Qty: 0 | Refills: 0 | Status: DISCONTINUED | OUTPATIENT
Start: 2018-06-24 | End: 2018-06-25

## 2018-06-24 RX ADMIN — LIDOCAINE 1 PATCH: 4 CREAM TOPICAL at 13:09

## 2018-06-24 RX ADMIN — OXYCODONE AND ACETAMINOPHEN 2 TABLET(S): 5; 325 TABLET ORAL at 00:20

## 2018-06-24 RX ADMIN — ENOXAPARIN SODIUM 40 MILLIGRAM(S): 100 INJECTION SUBCUTANEOUS at 11:54

## 2018-06-24 RX ADMIN — Medication 81 MILLIGRAM(S): at 11:53

## 2018-06-24 RX ADMIN — Medication 1 DROP(S): at 17:35

## 2018-06-24 RX ADMIN — GABAPENTIN 100 MILLIGRAM(S): 400 CAPSULE ORAL at 13:43

## 2018-06-24 RX ADMIN — OXYCODONE AND ACETAMINOPHEN 1 TABLET(S): 5; 325 TABLET ORAL at 15:11

## 2018-06-24 RX ADMIN — SIMVASTATIN 40 MILLIGRAM(S): 20 TABLET, FILM COATED ORAL at 21:57

## 2018-06-24 RX ADMIN — CYCLOBENZAPRINE HYDROCHLORIDE 5 MILLIGRAM(S): 10 TABLET, FILM COATED ORAL at 11:53

## 2018-06-24 RX ADMIN — OXYCODONE AND ACETAMINOPHEN 1 TABLET(S): 5; 325 TABLET ORAL at 11:53

## 2018-06-24 RX ADMIN — LISINOPRIL 2.5 MILLIGRAM(S): 2.5 TABLET ORAL at 05:27

## 2018-06-24 RX ADMIN — OXYCODONE AND ACETAMINOPHEN 2 TABLET(S): 5; 325 TABLET ORAL at 15:17

## 2018-06-24 NOTE — PROGRESS NOTE ADULT - ASSESSMENT
pt  with htn,  dm 2,  admitted with back  pain, radiating down laft   leg   no motor  weakness   pt  eval   high wbc, awaiting u/a, afebrile, no  cp/.sob/.a bd  pain/ dysuria  hyponatremia, improving   hold  diuretics   bmp in am  seen by ortho, no imaging needed   family wants neuro eval, called,/ spoke  with  np  legs/ 5/5 strength

## 2018-06-25 ENCOUNTER — TRANSCRIPTION ENCOUNTER (OUTPATIENT)
Age: 78
End: 2018-06-25

## 2018-06-25 VITALS
OXYGEN SATURATION: 100 % | DIASTOLIC BLOOD PRESSURE: 80 MMHG | HEART RATE: 77 BPM | TEMPERATURE: 98 F | SYSTOLIC BLOOD PRESSURE: 128 MMHG | RESPIRATION RATE: 18 BRPM

## 2018-06-25 LAB
ANION GAP SERPL CALC-SCNC: 11 MMOL/L — SIGNIFICANT CHANGE UP (ref 5–17)
BUN SERPL-MCNC: 27 MG/DL — HIGH (ref 7–23)
CALCIUM SERPL-MCNC: 9.1 MG/DL — SIGNIFICANT CHANGE UP (ref 8.4–10.5)
CHLORIDE SERPL-SCNC: 98 MMOL/L — SIGNIFICANT CHANGE UP (ref 96–108)
CO2 SERPL-SCNC: 23 MMOL/L — SIGNIFICANT CHANGE UP (ref 22–31)
CREAT SERPL-MCNC: 0.98 MG/DL — SIGNIFICANT CHANGE UP (ref 0.5–1.3)
GLUCOSE BLDC GLUCOMTR-MCNC: 124 MG/DL — HIGH (ref 70–99)
GLUCOSE BLDC GLUCOMTR-MCNC: 147 MG/DL — HIGH (ref 70–99)
GLUCOSE SERPL-MCNC: 131 MG/DL — HIGH (ref 70–99)
POTASSIUM SERPL-MCNC: 4.6 MMOL/L — SIGNIFICANT CHANGE UP (ref 3.5–5.3)
POTASSIUM SERPL-SCNC: 4.6 MMOL/L — SIGNIFICANT CHANGE UP (ref 3.5–5.3)
SODIUM SERPL-SCNC: 132 MMOL/L — LOW (ref 135–145)

## 2018-06-25 PROCEDURE — 81001 URINALYSIS AUTO W/SCOPE: CPT

## 2018-06-25 PROCEDURE — 85652 RBC SED RATE AUTOMATED: CPT

## 2018-06-25 PROCEDURE — 99285 EMERGENCY DEPT VISIT HI MDM: CPT

## 2018-06-25 PROCEDURE — 82550 ASSAY OF CK (CPK): CPT

## 2018-06-25 PROCEDURE — 87086 URINE CULTURE/COLONY COUNT: CPT

## 2018-06-25 PROCEDURE — 86140 C-REACTIVE PROTEIN: CPT

## 2018-06-25 PROCEDURE — 72100 X-RAY EXAM L-S SPINE 2/3 VWS: CPT

## 2018-06-25 PROCEDURE — 82962 GLUCOSE BLOOD TEST: CPT

## 2018-06-25 PROCEDURE — 80048 BASIC METABOLIC PNL TOTAL CA: CPT

## 2018-06-25 PROCEDURE — 83036 HEMOGLOBIN GLYCOSYLATED A1C: CPT

## 2018-06-25 PROCEDURE — 84484 ASSAY OF TROPONIN QUANT: CPT

## 2018-06-25 PROCEDURE — 82553 CREATINE MB FRACTION: CPT

## 2018-06-25 PROCEDURE — 97161 PT EVAL LOW COMPLEX 20 MIN: CPT

## 2018-06-25 PROCEDURE — 85027 COMPLETE CBC AUTOMATED: CPT

## 2018-06-25 RX ORDER — LISINOPRIL 2.5 MG/1
1 TABLET ORAL
Qty: 14 | Refills: 0 | OUTPATIENT
Start: 2018-06-25 | End: 2018-07-08

## 2018-06-25 RX ORDER — CANAGLIFLOZIN 100 MG/1
0.5 TABLET, FILM COATED ORAL
Qty: 0 | Refills: 0 | COMMUNITY

## 2018-06-25 RX ORDER — LIDOCAINE 4 G/100G
1 CREAM TOPICAL
Qty: 14 | Refills: 0 | OUTPATIENT
Start: 2018-06-25 | End: 2018-07-08

## 2018-06-25 RX ORDER — CYCLOBENZAPRINE HYDROCHLORIDE 10 MG/1
1 TABLET, FILM COATED ORAL
Qty: 10 | Refills: 0 | OUTPATIENT
Start: 2018-06-25 | End: 2018-06-29

## 2018-06-25 RX ORDER — LANOLIN ALCOHOL/MO/W.PET/CERES
3 CREAM (GRAM) TOPICAL AT BEDTIME
Qty: 0 | Refills: 0 | Status: COMPLETED | OUTPATIENT
Start: 2018-06-25 | End: 2018-06-25

## 2018-06-25 RX ORDER — VALSARTAN 80 MG/1
1 TABLET ORAL
Qty: 0 | Refills: 0 | COMMUNITY

## 2018-06-25 RX ORDER — LANOLIN ALCOHOL/MO/W.PET/CERES
3 CREAM (GRAM) TOPICAL ONCE
Qty: 0 | Refills: 0 | Status: COMPLETED | OUTPATIENT
Start: 2018-06-25 | End: 2018-06-25

## 2018-06-25 RX ORDER — ASPIRIN/CALCIUM CARB/MAGNESIUM 324 MG
1 TABLET ORAL
Qty: 30 | Refills: 0 | OUTPATIENT
Start: 2018-06-25 | End: 2018-07-24

## 2018-06-25 RX ORDER — TIMOLOL 0.5 %
1 DROPS OPHTHALMIC (EYE)
Qty: 0 | Refills: 0 | COMMUNITY
Start: 2018-06-25

## 2018-06-25 RX ORDER — ASPIRIN/CALCIUM CARB/MAGNESIUM 324 MG
1 TABLET ORAL
Qty: 0 | Refills: 0 | COMMUNITY
Start: 2018-06-25

## 2018-06-25 RX ADMIN — LIDOCAINE 1 PATCH: 4 CREAM TOPICAL at 12:22

## 2018-06-25 RX ADMIN — GABAPENTIN 100 MILLIGRAM(S): 400 CAPSULE ORAL at 12:22

## 2018-06-25 RX ADMIN — LISINOPRIL 2.5 MILLIGRAM(S): 2.5 TABLET ORAL at 05:27

## 2018-06-25 RX ADMIN — ENOXAPARIN SODIUM 40 MILLIGRAM(S): 100 INJECTION SUBCUTANEOUS at 12:22

## 2018-06-25 RX ADMIN — Medication 3 MILLIGRAM(S): at 00:59

## 2018-06-25 RX ADMIN — OXYCODONE AND ACETAMINOPHEN 1 TABLET(S): 5; 325 TABLET ORAL at 10:26

## 2018-06-25 RX ADMIN — OXYCODONE AND ACETAMINOPHEN 1 TABLET(S): 5; 325 TABLET ORAL at 11:00

## 2018-06-25 RX ADMIN — Medication 81 MILLIGRAM(S): at 12:23

## 2018-06-25 RX ADMIN — LIDOCAINE 1 PATCH: 4 CREAM TOPICAL at 00:28

## 2018-06-25 RX ADMIN — Medication 1 DROP(S): at 05:26

## 2018-06-25 NOTE — PROGRESS NOTE ADULT - ASSESSMENT
pt  with htn,  dm 2,  admitted with back  pain, radiating down left   leg   no motor  weakness. legs/5/5   pt  eval   afebrile, no  cp/.sob/.a bd  pain/ dysuria  hyponatremia, improving   hold  diuretics  seen by ortho, no imaging needed   family wants neuro eval, called,/ spoke  with  np  legs/ 5/5 strength  pt  eval  and then  d/c    cleared for d/c

## 2018-06-25 NOTE — DISCHARGE NOTE ADULT - CARE PLAN
Principal Discharge DX:	Low back pain  Goal:	Improved  Assessment and plan of treatment:	Continue with pain regimen and physical therapy as prescribed.  Follow-up outpatient with your primary care physician and Neurologist Dr. Huang within 1 week. Call for appointment.  Secondary Diagnosis:	Unable to walk  Assessment and plan of treatment:	Continue with pain regimen and physical therapy as prescribed.  Follow-up outpatient with your primary care physician and Neurologist Dr. Huang within 1 week. Call for appointment.  Secondary Diagnosis:	Hyponatremia  Assessment and plan of treatment:	Do not resume your diuretic (s) until instructed by your doctor.  Follow-up with your primary care physician within 1 week. Call for appointment.  Secondary Diagnosis:	DM2 (diabetes mellitus, type 2)  Assessment and plan of treatment:	HgA1C this admission.6.8  Make sure you get your HgA1c checked every three months.  If you take oral diabetes medications, check your blood glucose two times a day.  If you take insulin, check your blood glucose before meals and at bedtime.  It's important not to skip any meals.  Keep a log of your blood glucose results and always take it with you to your doctor appointments.  Keep a list of your current medications including injectables and over the counter medications and bring this medication list with you to all your doctor appointments.  If you have not seen your ophthalmologist this year call for appointment.  Check your feet daily for redness, sores, or openings. Do not self treat. If no improvement in two days call your primary care physician for an appointment.  Low blood sugar (hypoglycemia) is a blood sugar below 70mg/dl. Check your blood sugar if you feel signs/symptoms of hypoglycemia. If your blood sugar is below 70 take 15 grams of carbohydrates (ex 4 oz of apple juice, 3-4 glucose tablets, or 4-6 oz of regular soda) wait 15 minutes and repeat blood sugar to make sure it comes up above 70.  If your blood sugar is above 70 and you are due for a meal, have a meal.  If you are not due for a meal have a snack.  This snack helps keeps your blood sugar at a safe range.  Secondary Diagnosis:	HTN (hypertension)  Assessment and plan of treatment:	Low salt diet  Activity as tolerated.  Take all medication as prescribed.  Follow up with your medical doctor for routine blood pressure monitoring at your next visit.  Notify your doctor if you have any of the following symptoms:   Dizziness, Lightheadedness, Blurry vision, Headache, Chest pain, Shortness of breath  Secondary Diagnosis:	HLD (hyperlipidemia)  Assessment and plan of treatment:	Continue with your cholesterol medications. Eat a heart healthy diet that is low in saturated fats and salt, and includes whole grains, fruits, vegetables and lean protein; exercise regularly (consult with your physician or cardiologist first); maintain a heart healthy weight; if you smoke - quit (A resource to help you stop smoking is the [facility] – phone number 357-550-8039.). Continue to follow with your primary physician or cardiologist. Principal Discharge DX:	Low back pain  Goal:	Improved  Assessment and plan of treatment:	Continue with pain regimen and physical therapy as prescribed.  Follow-up outpatient with your primary care physician and Orthopedist  Dr. Huang within 1 week. Call for appointment.  Secondary Diagnosis:	Unable to walk  Assessment and plan of treatment:	Continue with pain regimen and physical therapy as prescribed.  Follow-up outpatient with your primary care physician and Orthopedist Dr. Huang within 1 week. Call for appointment.  Secondary Diagnosis:	Hyponatremia  Assessment and plan of treatment:	Do not resume your diuretic (s) until instructed by your doctor.  Follow-up with your primary care physician within 1 week. Call for appointment.  Secondary Diagnosis:	DM2 (diabetes mellitus, type 2)  Assessment and plan of treatment:	HgA1C this admission.6.8  Make sure you get your HgA1c checked every three months.  If you take oral diabetes medications, check your blood glucose two times a day.  If you take insulin, check your blood glucose before meals and at bedtime.  It's important not to skip any meals.  Keep a log of your blood glucose results and always take it with you to your doctor appointments.  Keep a list of your current medications including injectables and over the counter medications and bring this medication list with you to all your doctor appointments.  If you have not seen your ophthalmologist this year call for appointment.  Check your feet daily for redness, sores, or openings. Do not self treat. If no improvement in two days call your primary care physician for an appointment.  Low blood sugar (hypoglycemia) is a blood sugar below 70mg/dl. Check your blood sugar if you feel signs/symptoms of hypoglycemia. If your blood sugar is below 70 take 15 grams of carbohydrates (ex 4 oz of apple juice, 3-4 glucose tablets, or 4-6 oz of regular soda) wait 15 minutes and repeat blood sugar to make sure it comes up above 70.  If your blood sugar is above 70 and you are due for a meal, have a meal.  If you are not due for a meal have a snack.  This snack helps keeps your blood sugar at a safe range.  Secondary Diagnosis:	HTN (hypertension)  Assessment and plan of treatment:	Low salt diet  Activity as tolerated.  Take all medication as prescribed.  Follow up with your medical doctor for routine blood pressure monitoring at your next visit.  Notify your doctor if you have any of the following symptoms:   Dizziness, Lightheadedness, Blurry vision, Headache, Chest pain, Shortness of breath  Secondary Diagnosis:	HLD (hyperlipidemia)  Assessment and plan of treatment:	Continue with your cholesterol medications. Eat a heart healthy diet that is low in saturated fats and salt, and includes whole grains, fruits, vegetables and lean protein; exercise regularly (consult with your physician or cardiologist first); maintain a heart healthy weight; if you smoke - quit (A resource to help you stop smoking is the [facility] – phone number 270-304-3577.). Continue to follow with your primary physician or cardiologist. Principal Discharge DX:	Low back pain  Goal:	Improved  Assessment and plan of treatment:	Continue with pain regimen and physical therapy as prescribed.  Follow-up outpatient with your primary care physician and Orthopedist  Dr. Huang within 1 week. Call for appointment.  Secondary Diagnosis:	Unable to walk  Assessment and plan of treatment:	Continue with pain regimen and physical therapy as prescribed.  Follow-up outpatient with your primary care physician and Orthopedist Dr. Huang within 1 week. Call for appointment.  Secondary Diagnosis:	Hyponatremia  Assessment and plan of treatment:	Do not resume your diuretic (s) until instructed by your doctor.  Follow-up with your primary care physician within 1 week. Call for appointment.  Secondary Diagnosis:	DM2 (diabetes mellitus, type 2)  Assessment and plan of treatment:	HgA1C this admission.6.8  Make sure you get your HgA1c checked every three months.  If you take oral diabetes medications, check your blood glucose two times a day.  If you take insulin, check your blood glucose before meals and at bedtime.  It's important not to skip any meals.  Keep a log of your blood glucose results and always take it with you to your doctor appointments.  Keep a list of your current medications including injectables and over the counter medications and bring this medication list with you to all your doctor appointments.  If you have not seen your ophthalmologist this year call for appointment.  Check your feet daily for redness, sores, or openings. Do not self treat. If no improvement in two days call your primary care physician for an appointment.  Low blood sugar (hypoglycemia) is a blood sugar below 70mg/dl. Check your blood sugar if you feel signs/symptoms of hypoglycemia. If your blood sugar is below 70 take 15 grams of carbohydrates (ex 4 oz of apple juice, 3-4 glucose tablets, or 4-6 oz of regular soda) wait 15 minutes and repeat blood sugar to make sure it comes up above 70.  If your blood sugar is above 70 and you are due for a meal, have a meal.  If you are not due for a meal have a snack.  This snack helps keeps your blood sugar at a safe range.  Secondary Diagnosis:	HTN (hypertension)  Assessment and plan of treatment:	Stop valsartan. Continue with Lisinopril. Follow-up with your primary care physician within 1 week.   Low salt diet  Activity as tolerated.  Take all medication as prescribed.  Follow up with your medical doctor for routine blood pressure monitoring at your next visit.  Notify your doctor if you have any of the following symptoms:   Dizziness, Lightheadedness, Blurry vision, Headache, Chest pain, Shortness of breath  Secondary Diagnosis:	HLD (hyperlipidemia)  Assessment and plan of treatment:	Continue with your cholesterol medications. Eat a heart healthy diet that is low in saturated fats and salt, and includes whole grains, fruits, vegetables and lean protein; exercise regularly (consult with your physician or cardiologist first); maintain a heart healthy weight; if you smoke - quit (A resource to help you stop smoking is the [facility] Tobacco [facility] – phone number 444-138-7682.). Continue to follow with your primary physician or cardiologist.  Secondary Diagnosis:	Hyperthyroidism  Assessment and plan of treatment:	Your body makes too much thyroid hormone.    Call your doctor if you feel more anxious, irritable, trouble sleeping, trembling, sweating a lot, fast heartbeat, tired, weight loss, diarrhea, abnormal periods  Your doctor will monitor thyroid blood work regularly to monitor levels  It is important to take medications prescribed  Take your medications as directed

## 2018-06-25 NOTE — CONSULT NOTE ADULT - ATTENDING COMMENTS
Chronic lumbar radiculopathy follows with orthopedics as outpatient, Dr. Huang.  Neuro exam non-focal.     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

## 2018-06-25 NOTE — DISCHARGE NOTE ADULT - MEDICATION SUMMARY - MEDICATIONS TO TAKE
I will START or STAY ON the medications listed below when I get home from the hospital:    Rolling walker (5 inch wheels). Use as directed. Dx: low back pain. ICD10: M54.5  -- Indication: For Assistive Device    oxyCODONE-acetaminophen 5 mg-325 mg oral tablet  -- 1 tab(s) by mouth once a day, As Needed -Moderate Pain (4 - 6) MDD:1  -- Indication: For Low back pain    aspirin 81 mg oral delayed release tablet  -- 1 tab(s) by mouth once a day  -- Indication: For Protect from clot formation    lisinopril 2.5 mg oral tablet  -- 1 tab(s) by mouth once a day  -- Indication: For Hypertension ( high blood pressure)    gabapentin 100 mg oral capsule  -- orally once a day  -- Indication: For chronic low back pain    Januvia 100 mg oral tablet  -- 0.5 cap(s) by mouth once a day  -- Indication: For Type 2 Diabetes mellitus    metFORMIN 1000 mg oral tablet  -- 1 tab(s) by mouth 2 times a day  -- Indication: For Type 2 Diabetes mellitus    simvastatin 40 mg oral tablet  -- 1 tab(s) by mouth once a day (at bedtime)  -- Indication: For HLD (hyperlipidemia)    methIMAzole 5 mg oral tablet  -- 1 tab(s) by mouth once a day  -- Indication: For Hyperthyroidism    lidocaine 5% topical film  -- Apply on skin to affected area once a day . May use OTC substitute if not covered by insurance.   -- Indication: For Low back pain    cyclobenzaprine 5 mg oral tablet  -- 1 tab(s) by mouth 2 times a day, As needed, Muscle Spasm  -- Indication: For muscle spasm    timolol maleate 0.5% ophthalmic solution  -- 1 drop(s) to each affected eye 2 times a day  -- Indication: For glaucoma I will START or STAY ON the medications listed below when I get home from the hospital:    Rolling walker (5 inch wheels). Use as directed. Dx: low back pain. ICD10: M54.5  -- Indication: For Assistive Device    oxyCODONE-acetaminophen 5 mg-325 mg oral tablet  -- 1 tab(s) by mouth once a day, As Needed -Moderate Pain (4 - 6) MDD:1  -- Indication: For Low back pain    aspirin 81 mg oral delayed release tablet  -- 1 tab(s) by mouth once a day  -- Indication: For protect from clot formation    lisinopril 2.5 mg oral tablet  -- 1 tab(s) by mouth once a day  -- Indication: For High blood pressure    gabapentin 100 mg oral capsule  -- orally once a day  -- Indication: For chronic low back pain    Januvia 100 mg oral tablet  -- 0.5 cap(s) by mouth once a day  -- Indication: For Type 2 Diabetes mellitus    metFORMIN 1000 mg oral tablet  -- 1 tab(s) by mouth 2 times a day  -- Indication: For Type 2 Diabetes mellitus    simvastatin 40 mg oral tablet  -- 1 tab(s) by mouth once a day (at bedtime)  -- Indication: For HLD (hyperlipidemia)    methIMAzole 5 mg oral tablet  -- 1 tab(s) by mouth once a day  -- Indication: For Hyperthyroidism    lidocaine 5% topical film  -- Apply on skin to affected area once a day . May use OTC substitute if not covered by insurance.   -- Indication: For Low back pain    cyclobenzaprine 5 mg oral tablet  -- 1 tab(s) by mouth 2 times a day, As needed, Muscle Spasm  -- Indication: For muscle spasm    timolol maleate 0.5% ophthalmic solution  -- 1 drop(s) to each affected eye 2 times a day  -- Indication: For glaucoma

## 2018-06-25 NOTE — DISCHARGE NOTE ADULT - CARE PROVIDER_API CALL
Dr. Huang, Neurologist  Phone: (   )    -  Fax: [phone]   )    -    Chase Acuna), Cardiovascular Disease; Internal Medicine  [address] [place] [address]  Phone: [phone]  Fax: [phone] Chase Acuna), Cardiovascular Disease; Internal Medicine  [address] [place] [address]  Phone: [phone]  Fax: [phone]    Manuel Huang), Orthopaedic Surgery  [address]  [place] [address]  Phone: [phone]  Fax: [phone]

## 2018-06-25 NOTE — DISCHARGE NOTE ADULT - CARE PROVIDERS DIRECT ADDRESSES
,DirectAddress_Unknown,DirectAddress_Unknown ,DirectAddress_Unknown,[email or web address]

## 2018-06-25 NOTE — PROGRESS NOTE ADULT - SUBJECTIVE AND OBJECTIVE BOX
- Patient seen and examined.  - In summary, patient is a 78 year old woman who presented with back pain (2018 07:26)  - Today, patient is without complaints.         *****MEDICATIONS:    MEDICATIONS  (STANDING):  aspirin enteric coated 81 milliGRAM(s) Oral daily  dextrose 5%. 1000 milliLiter(s) (50 mL/Hr) IV Continuous <Continuous>  dextrose 50% Injectable 12.5 Gram(s) IV Push once  dextrose 50% Injectable 25 Gram(s) IV Push once  dextrose 50% Injectable 25 Gram(s) IV Push once  enoxaparin Injectable 40 milliGRAM(s) SubCutaneous daily  gabapentin 100 milliGRAM(s) Oral daily  insulin lispro (HumaLOG) corrective regimen sliding scale   SubCutaneous three times a day before meals  lisinopril 2.5 milliGRAM(s) Oral daily  methimazole 5 milliGRAM(s) Oral daily  simvastatin 40 milliGRAM(s) Oral at bedtime  sodium chloride 0.9%. 1000 milliLiter(s) (75 mL/Hr) IV Continuous <Continuous>    MEDICATIONS  (PRN):  cyclobenzaprine 5 milliGRAM(s) Oral two times a day PRN Muscle Spasm  dextrose 40% Gel 15 Gram(s) Oral once PRN Blood Glucose LESS THAN 70 milliGRAM(s)/deciliter  glucagon  Injectable 1 milliGRAM(s) IntraMuscular once PRN Glucose LESS THAN 70 milligrams/deciliter  oxyCODONE    5 mG/acetaminophen 325 mG 1 Tablet(s) Oral every 8 hours PRN Moderate Pain (4 - 6)  oxyCODONE    5 mG/acetaminophen 325 mG 2 Tablet(s) Oral every 8 hours PRN Severe Pain (7 - 10)           ***** REVIEW OF SYSTEM:  GEN: no fever, no chills, no pain  RESP: no SOB, no cough, no sputum  CVS: no chest pain, no palpitations, no edema  GI: no abdominal pain, no nausea, no vomiting, no constipation, no diarrhea  : no dysuria, no frequency  NEURO: no headache, no dizziness  PSYCH: no depression, not anxious  Derm : no itching, no rash         ***** VITAL SIGNS:  T(F): 97.4 (18 @ 04:45), Max: 97.8 (18 @ 23:49)  HR: 71 (18 @ 04:45) (71 - 80)  BP: 124/75 (18 @ 04:45) (95/60 - 140/67)  RR: 18 (18 @ 04:45) (18 - 18)  SpO2: 100% (18 @ 04:45) (98% - 100%)  Wt(kg): --  ,   I&O's Summary    2018 07:01  -  2018 07:00  --------------------------------------------------------  IN: 1670 mL / OUT: 200 mL / NET: 1470 mL             *****PHYSICAL EXAM:  GEN: A&O X 3 , NAD , comfortable  HEENT: NCAT, EOMI, MMM, no icterus  NECK: Supple, No JVD  CVS: S1S2 , regular , No M/R/G appreciated  PULM: CTA B/L,  no W/R/R appreciated  ABD.: soft. non tender, non distended,  bowel sounds present  Extrem: intact pulses , no edema noted  Derm: No rash or ecchymosis noted  PSYCH: normal mood, no depression, not anxious         *****LAB AND IMAGIN.1   8.6   )-----------( 157      ( 2018 07:00 )             36.0               06-    127<L>  |  92<L>  |  28<H>  ----------------------------<  108<H>  4.6   |  23  |  0.97    Ca    8.5      2018 07:00             CARDIAC MARKERS ( 2018 10:59 )  x     / x     / 63 U/L / x     / 2.2 ng/mL              Urinalysis Basic - ( 2018 12:46 )    Color: PL Yellow / Appearance: Clear / S.011 / pH: x  Gluc: x / Ketone: Negative  / Bili: Negative / Urobili: Negative   Blood: x / Protein: Negative / Nitrite: Negative   Leuk Esterase: Small / RBC: 0-2 /HPF / WBC 2-5 /HPF   Sq Epi: x / Non Sq Epi: Occasional /HPF / Bacteria: x      [All pertinent recent Imaging/Reports reviewed]         *****A S S E S S M E N T   A N D   P L A N :  78F with worsening of chronic LBP, HTN, low NA  ARB held  on IVF  monitor Na, is improving  f/u ID  BP is stable        __________________________  A. HUMBERTO Gonzales.
- Patient seen and examined.  - In summary, patient is a 78 year old woman who presented with back pain (2018 07:26)  - Today, patient is without complaints.         *****MEDICATIONS:    MEDICATIONS  (STANDING):  aspirin enteric coated 81 milliGRAM(s) Oral daily  dextrose 5%. 1000 milliLiter(s) (50 mL/Hr) IV Continuous <Continuous>  dextrose 50% Injectable 12.5 Gram(s) IV Push once  dextrose 50% Injectable 25 Gram(s) IV Push once  dextrose 50% Injectable 25 Gram(s) IV Push once  enoxaparin Injectable 40 milliGRAM(s) SubCutaneous daily  gabapentin 100 milliGRAM(s) Oral daily  insulin lispro (HumaLOG) corrective regimen sliding scale   SubCutaneous three times a day before meals  insulin lispro (HumaLOG) corrective regimen sliding scale   SubCutaneous at bedtime  lidocaine   Patch 1 Patch Transdermal daily  lisinopril 2.5 milliGRAM(s) Oral daily  methimazole 5 milliGRAM(s) Oral daily  simvastatin 40 milliGRAM(s) Oral at bedtime  timolol 0.5% Solution 1 Drop(s) Both EYES two times a day    MEDICATIONS  (PRN):  cyclobenzaprine 5 milliGRAM(s) Oral two times a day PRN Muscle Spasm  dextrose 40% Gel 15 Gram(s) Oral once PRN Blood Glucose LESS THAN 70 milliGRAM(s)/deciliter  glucagon  Injectable 1 milliGRAM(s) IntraMuscular once PRN Glucose LESS THAN 70 milligrams/deciliter  oxyCODONE    5 mG/acetaminophen 325 mG 1 Tablet(s) Oral every 8 hours PRN Moderate Pain (4 - 6)             ***** REVIEW OF SYSTEM:  GEN: no fever, no chills, no pain  RESP: no SOB, no cough, no sputum  CVS: no chest pain, no palpitations, no edema  GI: no abdominal pain, no nausea, no vomiting, no constipation, no diarrhea  : no dysuria, no frequency  NEURO: no headache, no dizziness  PSYCH: no depression, not anxious  Derm : no itching, no rash         ***** VITAL SIGNS:    T(F): 98.1 (18 @ 08:46), Max: 98.3 (18 @ 23:39)  HR: 80 (18 @ 08:46) (73 - 81)  BP: 119/76 (18 @ 08:46) (107/59 - 128/72)  RR: 18 (18 @ 08:46) (18 - 18)  SpO2: 97% (18 @ 08:46) (96% - 99%)  Wt(kg): --  ,   I&O's Summary    2018 07:01  -  2018 07:00  --------------------------------------------------------  IN: 1000 mL / OUT: 600 mL / NET: 400 mL                 *****PHYSICAL EXAM:  GEN: A&O X 3 , NAD , comfortable  HEENT: NCAT, EOMI, MMM, no icterus  NECK: Supple, No JVD  CVS: S1S2 , regular , No M/R/G appreciated  PULM: CTA B/L,  no W/R/R appreciated  ABD.: soft. non tender, non distended,  bowel sounds present  Extrem: intact pulses , no edema noted  Derm: No rash or ecchymosis noted  PSYCH: normal mood, no depression, not anxious         *****LAB AND IMAGIN.1   8.6   )-----------( 157      ( 2018 07:00 )             36.0                   132<L>  |  98  |  27<H>  ----------------------------<  131<H>  4.6   |  23  |  0.98    Ca    9.1      2018 07:22             CARDIAC MARKERS ( 2018 10:59 )  x     / x     / 63 U/L / x     / 2.2 ng/mL        [All pertinent recent Imaging/Reports reviewed]         *****A S S E S S M E N T   A N D   P L A N :  78F with worsening of chronic LBP, HTN, low NA  ARB held  on IVF  Na improving  BP is stable  neuro eval noted  PT  DCP    __________________________  ADAM Gonzales D.O.
c/o  back pain   able  to ambulate  to bathroom  no  leg weakness  REVIEW OF SYSTEMS:  GEN: no fever,    no chills  RESP: no SOB,   no cough  CVS: no chest pain,   no palpitations  GI: no abdominal pain,   no nausea,   no vomiting,   no constipation,   no diarrhea  : no dysuria,   no frequency  NEURO: no headache,   no dizziness  PSYCH: no depression,   not anxious  Derm : no rash    MEDICATIONS  (STANDING):  aspirin enteric coated 81 milliGRAM(s) Oral daily  dextrose 5%. 1000 milliLiter(s) (50 mL/Hr) IV Continuous <Continuous>  dextrose 50% Injectable 12.5 Gram(s) IV Push once  dextrose 50% Injectable 25 Gram(s) IV Push once  dextrose 50% Injectable 25 Gram(s) IV Push once  enoxaparin Injectable 40 milliGRAM(s) SubCutaneous daily  gabapentin 100 milliGRAM(s) Oral daily  insulin lispro (HumaLOG) corrective regimen sliding scale   SubCutaneous three times a day before meals  lisinopril 2.5 milliGRAM(s) Oral daily  methimazole 5 milliGRAM(s) Oral daily  simvastatin 40 milliGRAM(s) Oral at bedtime  sodium chloride 0.9%. 1000 milliLiter(s) (75 mL/Hr) IV Continuous <Continuous>    MEDICATIONS  (PRN):  cyclobenzaprine 5 milliGRAM(s) Oral two times a day PRN Muscle Spasm  dextrose 40% Gel 15 Gram(s) Oral once PRN Blood Glucose LESS THAN 70 milliGRAM(s)/deciliter  glucagon  Injectable 1 milliGRAM(s) IntraMuscular once PRN Glucose LESS THAN 70 milligrams/deciliter  oxyCODONE    5 mG/acetaminophen 325 mG 1 Tablet(s) Oral every 8 hours PRN Moderate Pain (4 - 6)  oxyCODONE    5 mG/acetaminophen 325 mG 2 Tablet(s) Oral every 8 hours PRN Severe Pain (7 - 10)      Vital Signs Last 24 Hrs  T(C): 36.3 (2018 04:45), Max: 36.6 (2018 23:49)  T(F): 97.4 (2018 04:45), Max: 97.8 (2018 23:49)  HR: 71 (2018 04:45) (71 - 80)  BP: 124/75 (2018 04:45) (95/60 - 140/67)  BP(mean): --  RR: 18 (2018 04:45) (18 - 18)  SpO2: 100% (2018 04:45) (98% - 100%)  CAPILLARY BLOOD GLUCOSE      POCT Blood Glucose.: 136 mg/dL (2018 08:53)  POCT Blood Glucose.: 217 mg/dL (2018 21:30)  POCT Blood Glucose.: 109 mg/dL (2018 17:50)  POCT Blood Glucose.: 186 mg/dL (2018 12:51)    I&O's Summary    2018 07:01  -  2018 07:00  --------------------------------------------------------  IN: 1670 mL / OUT: 200 mL / NET: 1470 mL    2018 07:01  -  2018 11:37  --------------------------------------------------------  IN: 180 mL / OUT: 0 mL / NET: 180 mL        PHYSICAL EXAM:  HEAD:  Atraumatic, Normocephalic  NECK: Supple, No   JVD  CHEST/LUNG:   no     rales,     no,    rhonchi  HEART: Regular rate and rhythm;         murmur  ABDOMEN: Soft, Nontender, ;   EXTREMITIES:        edema  NEUROLOGY:  alert    LABS:                        12.1   8.6   )-----------( 157      ( 2018 07:00 )             36.0     06-24    127<L>  |  92<L>  |  28<H>  ----------------------------<  108<H>  4.6   |  23  |  0.97    Ca    8.5      2018 07:00        CARDIAC MARKERS ( 2018 10:59 )  x     / x     / 63 U/L / x     / 2.2 ng/mL      Urinalysis Basic - ( 2018 12:46 )    Color: PL Yellow / Appearance: Clear / S.011 / pH: x  Gluc: x / Ketone: Negative  / Bili: Negative / Urobili: Negative   Blood: x / Protein: Negative / Nitrite: Negative   Leuk Esterase: Small / RBC: 0-2 /HPF / WBC 2-5 /HPF   Sq Epi: x / Non Sq Epi: Occasional /HPF / Bacteria: x                      Consultant(s) Notes Reviewed:      Care Discussed with Consultants/Other Providers:
less pain, pt  able to ambulate  no leg weakness  REVIEW OF SYSTEMS:  GEN: no fever,    no chills  RESP: no SOB,   no cough  CVS: no chest pain,   no palpitations  GI: no abdominal pain,   no nausea,   no vomiting,   no constipation,   no diarrhea  : no dysuria,   no frequency  NEURO: no headache,   no dizziness  PSYCH: no depression,   not anxious  Derm : no rash    MEDICATIONS  (STANDING):  aspirin enteric coated 81 milliGRAM(s) Oral daily  dextrose 5%. 1000 milliLiter(s) (50 mL/Hr) IV Continuous <Continuous>  dextrose 50% Injectable 12.5 Gram(s) IV Push once  dextrose 50% Injectable 25 Gram(s) IV Push once  dextrose 50% Injectable 25 Gram(s) IV Push once  enoxaparin Injectable 40 milliGRAM(s) SubCutaneous daily  gabapentin 100 milliGRAM(s) Oral daily  insulin lispro (HumaLOG) corrective regimen sliding scale   SubCutaneous three times a day before meals  insulin lispro (HumaLOG) corrective regimen sliding scale   SubCutaneous at bedtime  lidocaine   Patch 1 Patch Transdermal daily  lisinopril 2.5 milliGRAM(s) Oral daily  methimazole 5 milliGRAM(s) Oral daily  simvastatin 40 milliGRAM(s) Oral at bedtime  sodium chloride 0.9%. 1000 milliLiter(s) (75 mL/Hr) IV Continuous <Continuous>  timolol 0.5% Solution 1 Drop(s) Both EYES two times a day    MEDICATIONS  (PRN):  cyclobenzaprine 5 milliGRAM(s) Oral two times a day PRN Muscle Spasm  dextrose 40% Gel 15 Gram(s) Oral once PRN Blood Glucose LESS THAN 70 milliGRAM(s)/deciliter  glucagon  Injectable 1 milliGRAM(s) IntraMuscular once PRN Glucose LESS THAN 70 milligrams/deciliter  oxyCODONE    5 mG/acetaminophen 325 mG 1 Tablet(s) Oral every 8 hours PRN Moderate Pain (4 - 6)  oxyCODONE    5 mG/acetaminophen 325 mG 2 Tablet(s) Oral every 8 hours PRN Severe Pain (7 - 10)      Vital Signs Last 24 Hrs  T(C): 36.7 (2018 08:46), Max: 36.8 (2018 23:39)  T(F): 98.1 (2018 08:46), Max: 98.3 (2018 23:39)  HR: 80 (2018 08:46) (73 - 81)  BP: 119/76 (2018 08:46) (107/59 - 128/72)  BP(mean): --  RR: 18 (2018 08:46) (18 - 18)  SpO2: 97% (2018 08:46) (96% - 99%)  CAPILLARY BLOOD GLUCOSE      POCT Blood Glucose.: 147 mg/dL (2018 08:37)  POCT Blood Glucose.: 182 mg/dL (2018 22:16)  POCT Blood Glucose.: 110 mg/dL (2018 17:22)  POCT Blood Glucose.: 139 mg/dL (2018 12:52)    I&O's Summary    2018 07:01  -  2018 07:00  --------------------------------------------------------  IN: 1000 mL / OUT: 600 mL / NET: 400 mL        PHYSICAL EXAM:  HEAD:  Atraumatic, Normocephalic  NECK: Supple, No   JVD  CHEST/LUNG:   no     rales,     no,    rhonchi  HEART: Regular rate and rhythm;         murmur  ABDOMEN: Soft, Nontender, ;   EXTREMITIES:        edema  NEUROLOGY:  alert    LABS:                        12.1   8.6   )-----------( 157      ( 2018 07:00 )             36.0     06-25    132<L>  |  98  |  27<H>  ----------------------------<  131<H>  4.6   |  23  |  0.98    Ca    9.1      2018 07:22        CARDIAC MARKERS ( 2018 10:59 )  x     / x     / 63 U/L / x     / 2.2 ng/mL      Urinalysis Basic - ( 2018 12:46 )    Color: PL Yellow / Appearance: Clear / S.011 / pH: x  Gluc: x / Ketone: Negative  / Bili: Negative / Urobili: Negative   Blood: x / Protein: Negative / Nitrite: Negative   Leuk Esterase: Small / RBC: 0-2 /HPF / WBC 2-5 /HPF   Sq Epi: x / Non Sq Epi: Occasional /HPF / Bacteria: x            Hemoglobin A1C, Whole Blood: 6.8 % ( @ 11:57)            Consultant(s) Notes Reviewed:      Care Discussed with Consultants/Other Providers:

## 2018-06-25 NOTE — DISCHARGE NOTE ADULT - PATIENT PORTAL LINK FT
You can access the [facility], offered by [facility], by registering with the following website: [email or web address]

## 2018-06-25 NOTE — DISCHARGE NOTE ADULT - PROVIDER TOKENS
FREE:[LAST:[Dr. Huang],FIRST:[Neurologist],PHONE:[(   )    -],FAX:[(   )    -]],TOKEN:'[id number]' TOKEN:'[id number]:'[id number]'

## 2018-06-25 NOTE — DISCHARGE NOTE ADULT - ADDITIONAL INSTRUCTIONS
Follow-up outpatient with your primary care physician and Neurologist Dr. Huang within 1 week. Call for appointment. Follow-up outpatient with your primary care physician and Orthopedist Dr. Huang within 1 week. Call for appointment.

## 2018-06-25 NOTE — DISCHARGE NOTE ADULT - PLAN OF CARE
HgA1C this admission.6.8  Make sure you get your HgA1c checked every three months.  If you take oral diabetes medications, check your blood glucose two times a day.  If you take insulin, check your blood glucose before meals and at bedtime.  It's important not to skip any meals.  Keep a log of your blood glucose results and always take it with you to your doctor appointments.  Keep a list of your current medications including injectables and over the counter medications and bring this medication list with you to all your doctor appointments.  If you have not seen your ophthalmologist this year call for appointment.  Check your feet daily for redness, sores, or openings. Do not self treat. If no improvement in two days call your primary care physician for an appointment.  Low blood sugar (hypoglycemia) is a blood sugar below 70mg/dl. Check your blood sugar if you feel signs/symptoms of hypoglycemia. If your blood sugar is below 70 take 15 grams of carbohydrates (ex 4 oz of apple juice, 3-4 glucose tablets, or 4-6 oz of regular soda) wait 15 minutes and repeat blood sugar to make sure it comes up above 70.  If your blood sugar is above 70 and you are due for a meal, have a meal.  If you are not due for a meal have a snack.  This snack helps keeps your blood sugar at a safe range. Low salt diet  Activity as tolerated.  Take all medication as prescribed.  Follow up with your medical doctor for routine blood pressure monitoring at your next visit.  Notify your doctor if you have any of the following symptoms:   Dizziness, Lightheadedness, Blurry vision, Headache, Chest pain, Shortness of breath Continue with your cholesterol medications. Eat a heart healthy diet that is low in saturated fats and salt, and includes whole grains, fruits, vegetables and lean protein; exercise regularly (consult with your physician or cardiologist first); maintain a heart healthy weight; if you smoke - quit (A resource to help you stop smoking is the [facility] – phone number 740-143-8487.). Continue to follow with your primary physician or cardiologist. Improved Continue with pain regimen and physical therapy as prescribed.  Follow-up outpatient with your primary care physician and Neurologist Dr. Huang within 1 week. Call for appointment. Do not resume your diuretic (s) until instructed by your doctor.  Follow-up with your primary care physician within 1 week. Call for appointment. Continue with pain regimen and physical therapy as prescribed.  Follow-up outpatient with your primary care physician and Orthopedist  Dr. Huang within 1 week. Call for appointment. Continue with pain regimen and physical therapy as prescribed.  Follow-up outpatient with your primary care physician and Orthopedist Dr. Huang within 1 week. Call for appointment. Lor Rosales  (RN)  2017 00:28:29 Stop valsartan. Continue with Lisinopril. Follow-up with your primary care physician within 1 week.   Low salt diet  Activity as tolerated.  Take all medication as prescribed.  Follow up with your medical doctor for routine blood pressure monitoring at your next visit.  Notify your doctor if you have any of the following symptoms:   Dizziness, Lightheadedness, Blurry vision, Headache, Chest pain, Shortness of breath Your body makes too much thyroid hormone.    Call your doctor if you feel more anxious, irritable, trouble sleeping, trembling, sweating a lot, fast heartbeat, tired, weight loss, diarrhea, abnormal periods  Your doctor will monitor thyroid blood work regularly to monitor levels  It is important to take medications prescribed  Take your medications as directed Lor Rosales  (RN)  2017 00:48:59 Caryn Jaimes  (RN)  2017 01:10:44

## 2018-06-25 NOTE — DISCHARGE NOTE ADULT - HOSPITAL COURSE
77 yo female with PMH of T2DM, HTN, HLD who presented with low back pain radiativng down left leg and unable to walk. Pt has no motor weakness, low extremities 5/5 strength, seen by Neurology. Xray lumbar spine shows chronic radiculopathy . No further imaging needed per Neuro. Pt follows outpatient Dr. Huang. PT recommends home PT . Hospital course complicated by hyponatremia, now improved, 132 today. Continue to hold diuretics. Pt is cleared for discharge with home PT. 77 yo female with PMH of T2DM, HTN, HLD who presented with low back pain radiativng down left leg and unable to walk. Pt has no motor weakness, low extremities 5/5 strength, seen by Neurology. 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 No further imaging needed per Neuro. Pt will follow up for further workup with outpatient Orthopedist Dr. Huang . PT recommends home PT . Hospital course complicated by hyponatremia, now improved, 132 today. Continue to hold diuretics. Pt is cleared for discharge with home PT. 77 yo female with PMH of T2DM, HTN, HLD who presented with low back pain radiativng down left leg and unable to walk. Pt has no motor weakness, low extremities 5/5 strength, seen by Neurology. 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 No further imaging needed per Neuro. Pt will follow up for further workup with outpatient Orthopedist Dr. Huang . PT recommends home PT . Hospital course complicated by hyponatremia, now improved, 132 today. Continue to hold diuretics. HTN  controlled on Lisinopril. Stop Valsartan. Pt is cleared for discharge with home PT. Pt will follow-up with her PMD and outpatient orthopedist Dr. Huang

## 2018-06-25 NOTE — DISCHARGE NOTE ADULT - MEDICATION SUMMARY - MEDICATIONS TO STOP TAKING
I will STOP taking the medications listed below when I get home from the hospital:    valsartan 160 mg oral tablet  -- 1 tab(s) by mouth once a day    Invokana 100 mg oral tablet  -- 0.5 cap(s) by mouth once a day

## 2018-06-25 NOTE — CONSULT NOTE ADULT - SUBJECTIVE AND OBJECTIVE BOX
HPI:  International Travel? No(1)    78y Female complaining of back pain general.	  78yof w/ HTN, HLD, DM2 p/w atraumatic low back pain . Gradual onset of pain in the left lower back with no inciting event or trauma worsening over the past week. Dull, aching pain, radiates down the left leg, worse w/ leg movements and ambulation, now at the point where she can barely ambulate. Has been taking diclofenac w/ minimal relief. Known hx of lumbar disc disease. No fevers, chills, or other systemic symptoms. No midline back pain, saddle anesthesia, bowel/bladder dysfunction.      Seen and examined at bedside.      Review of Systems:  Denies numbness/tingling, chest pain, SOB     PAST MEDICAL & SURGICAL HISTORY:  DM2 (diabetes mellitus, type 2)  HLD (hyperlipidemia)  HTN (hypertension)    Past Hospitalizations:  MEDICATIONS  (STANDING):  aspirin enteric coated 81 milliGRAM(s) Oral daily  dextrose 5%. 1000 milliLiter(s) (50 mL/Hr) IV Continuous <Continuous>  dextrose 50% Injectable 12.5 Gram(s) IV Push once  dextrose 50% Injectable 25 Gram(s) IV Push once  dextrose 50% Injectable 25 Gram(s) IV Push once  enoxaparin Injectable 40 milliGRAM(s) SubCutaneous daily  gabapentin 100 milliGRAM(s) Oral daily  insulin lispro (HumaLOG) corrective regimen sliding scale   SubCutaneous three times a day before meals  insulin lispro (HumaLOG) corrective regimen sliding scale   SubCutaneous at bedtime  lidocaine   Patch 1 Patch Transdermal daily  lisinopril 2.5 milliGRAM(s) Oral daily  methimazole 5 milliGRAM(s) Oral daily  simvastatin 40 milliGRAM(s) Oral at bedtime  timolol 0.5% Solution 1 Drop(s) Both EYES two times a day    MEDICATIONS  (PRN):  cyclobenzaprine 5 milliGRAM(s) Oral two times a day PRN Muscle Spasm  dextrose 40% Gel 15 Gram(s) Oral once PRN Blood Glucose LESS THAN 70 milliGRAM(s)/deciliter  glucagon  Injectable 1 milliGRAM(s) IntraMuscular once PRN Glucose LESS THAN 70 milligrams/deciliter  oxyCODONE    5 mG/acetaminophen 325 mG 1 Tablet(s) Oral every 8 hours PRN Moderate Pain (4 - 6)    Allergies    No Known Allergies    Intolerances          FAMILY HISTORY:  No pertinent neurological history     Social History  Lives at home     Vital Signs Last 24 Hrs  T(C): 36.7 (25 Jun 2018 08:46), Max: 36.8 (24 Jun 2018 23:39)  T(F): 98.1 (25 Jun 2018 08:46), Max: 98.3 (24 Jun 2018 23:39)  HR: 80 (25 Jun 2018 08:46) (73 - 81)  BP: 119/76 (25 Jun 2018 08:46) (107/59 - 128/72)  BP(mean): --  RR: 18 (25 Jun 2018 08:46) (18 - 18)  SpO2: 97% (25 Jun 2018 08:46) (96% - 99%)  Daily     Daily       GENERAL PHYSICAL EXAM  All physical exam findings normal, except for those marked:  General:	well nourished, not acutely or chronically ill-appearing  HEENT:	normocephalic, atraumatic, clear conjunctiva   Neck:          supple, full range of motion   Cardiovascular:	regular rate and variability   Abdominal	:                    soft, ND, NT  Extremities:	no  edema b/l LE     NEUROLOGIC EXAM  Mental Status:     Oriented to time/place/person; Good eye contact ; follow simple commands ;  Age appropriate language  and fund of  knowledge.  Cranial Nerves:   PERRL, EOMI, no facial asymmetry , V1-V3 intact , symmetric palate, tongue midline.   Eyes:			Normal: optic discs   Visual Fields:		Full visual field  Muscle Strength:	 Full strength 5/5, proximal and distal,  upper and lower extremities  Muscle Tone:	Normal tone  Deep Tendon Reflexes:         2+/4  : Biceps, Brachioradialis, Triceps Bilateral;  2+/4 : Pattelar, Ankle bilateral. No clonus.  Plantar Response:	Plantar reflexes flexion bilaterally  Sensation:		Intact to pain, light touch, temperature    Coordination/	No dysmetria in finger to nose test bilaterally  Cerebellum	  Tandem Gait/Romberg	ambulates with assistance     Lab Results:                        12.1   8.6   )-----------( 157      ( 24 Jun 2018 07:00 )             36.0     06-25    132<L>  |  98  |  27<H>  ----------------------------<  131<H>  4.6   |  23  |  0.98    Ca    9.1      25 Jun 2018 07:22      Imaging Studies:  < from: Xray Lumbar Spine AP + Lateral (06.24.18 @ 14:49) >  IMPRESSION:   Redemonstrated L1 vertebral body compressive deformity. No acute   compression fracture.    < end of copied text >

## 2018-06-26 PROBLEM — E78.5 HYPERLIPIDEMIA, UNSPECIFIED: Chronic | Status: ACTIVE | Noted: 2018-06-23

## 2018-06-26 PROBLEM — I10 ESSENTIAL (PRIMARY) HYPERTENSION: Chronic | Status: ACTIVE | Noted: 2018-06-23

## 2018-06-26 PROBLEM — E11.9 TYPE 2 DIABETES MELLITUS WITHOUT COMPLICATIONS: Chronic | Status: ACTIVE | Noted: 2018-06-23

## 2018-06-28 ENCOUNTER — APPOINTMENT (OUTPATIENT)
Dept: ORTHOPEDIC SURGERY | Facility: CLINIC | Age: 78
End: 2018-06-28

## 2018-08-13 ENCOUNTER — APPOINTMENT (OUTPATIENT)
Dept: ORTHOPEDIC SURGERY | Facility: CLINIC | Age: 78
End: 2018-08-13
Payer: MEDICARE

## 2018-08-13 VITALS — HEIGHT: 63 IN | DIASTOLIC BLOOD PRESSURE: 78 MMHG | HEART RATE: 87 BPM | SYSTOLIC BLOOD PRESSURE: 143 MMHG

## 2018-08-13 PROCEDURE — 99214 OFFICE O/P EST MOD 30 MIN: CPT

## 2018-09-14 ENCOUNTER — APPOINTMENT (OUTPATIENT)
Dept: ORTHOPEDIC SURGERY | Facility: CLINIC | Age: 78
End: 2018-09-14
Payer: MEDICARE

## 2018-09-14 DIAGNOSIS — M54.5 LOW BACK PAIN: ICD-10-CM

## 2018-09-14 DIAGNOSIS — M43.10 SPONDYLOLISTHESIS, SITE UNSPECIFIED: ICD-10-CM

## 2018-09-14 DIAGNOSIS — M54.16 RADICULOPATHY, LUMBAR REGION: ICD-10-CM

## 2018-09-14 PROCEDURE — 99214 OFFICE O/P EST MOD 30 MIN: CPT

## 2019-04-10 ENCOUNTER — TRANSCRIPTION ENCOUNTER (OUTPATIENT)
Age: 79
End: 2019-04-10

## 2019-04-11 ENCOUNTER — EMERGENCY (EMERGENCY)
Facility: HOSPITAL | Age: 79
LOS: 1 days | Discharge: HOME | End: 2019-04-11
Attending: EMERGENCY MEDICINE
Payer: MEDICARE

## 2019-04-11 VITALS
DIASTOLIC BLOOD PRESSURE: 84 MMHG | HEART RATE: 75 BPM | TEMPERATURE: 98 F | SYSTOLIC BLOOD PRESSURE: 143 MMHG | RESPIRATION RATE: 16 BRPM | OXYGEN SATURATION: 96 %

## 2019-04-11 PROCEDURE — 70486 CT MAXILLOFACIAL W/O DYE: CPT | Mod: 26

## 2019-04-11 PROCEDURE — 70450 CT HEAD/BRAIN W/O DYE: CPT | Mod: 26

## 2019-04-11 PROCEDURE — 99284 EMERGENCY DEPT VISIT MOD MDM: CPT

## 2019-04-11 RX ORDER — ACETAMINOPHEN 500 MG
650 TABLET ORAL ONCE
Qty: 0 | Refills: 0 | Status: COMPLETED | OUTPATIENT
Start: 2019-04-11 | End: 2019-04-11

## 2019-04-11 RX ADMIN — Medication 650 MILLIGRAM(S): at 22:46

## 2019-04-11 NOTE — ED ADULT NURSE NOTE - OBJECTIVE STATEMENT
79 year old female presents to the ED via EMS with c/o right sided head laceration. Pt was reaching for ceramic plate which fell onto her face, giving her a deep laceration to right side of forehead with active bleeding noted. No LOC after incident. Pt has mild c/o HA at this time. No c/o CP, SOB, N/V/D, dizziness or lethargy. Neuro status is in tact. Comfort and safety measures maintained.

## 2019-04-12 VITALS
RESPIRATION RATE: 16 BRPM | TEMPERATURE: 98 F | SYSTOLIC BLOOD PRESSURE: 124 MMHG | DIASTOLIC BLOOD PRESSURE: 80 MMHG | HEART RATE: 88 BPM | OXYGEN SATURATION: 97 %

## 2019-04-12 DIAGNOSIS — Z90.49 ACQUIRED ABSENCE OF OTHER SPECIFIED PARTS OF DIGESTIVE TRACT: Chronic | ICD-10-CM

## 2019-04-12 PROCEDURE — 76377 3D RENDER W/INTRP POSTPROCES: CPT | Mod: 26

## 2019-04-12 PROCEDURE — 72125 CT NECK SPINE W/O DYE: CPT | Mod: 26

## 2019-04-12 PROCEDURE — 14060 TIS TRNFR E/N/E/L 10 SQ CM/<: CPT

## 2019-04-12 PROCEDURE — 99285 EMERGENCY DEPT VISIT HI MDM: CPT | Mod: 25

## 2019-04-12 PROCEDURE — 72125 CT NECK SPINE W/O DYE: CPT

## 2019-04-12 PROCEDURE — 70450 CT HEAD/BRAIN W/O DYE: CPT

## 2019-04-12 PROCEDURE — 14061 TIS TRNFR E/N/E/L10.1-30SQCM: CPT

## 2019-04-12 PROCEDURE — 76377 3D RENDER W/INTRP POSTPROCES: CPT

## 2019-04-12 PROCEDURE — 70486 CT MAXILLOFACIAL W/O DYE: CPT

## 2019-04-12 PROCEDURE — 90715 TDAP VACCINE 7 YRS/> IM: CPT

## 2019-04-12 PROCEDURE — 90471 IMMUNIZATION ADMIN: CPT

## 2019-04-12 RX ORDER — CEPHALEXIN 500 MG
1 CAPSULE ORAL
Qty: 10 | Refills: 0 | OUTPATIENT
Start: 2019-04-12 | End: 2019-04-16

## 2019-04-12 RX ORDER — TETANUS TOXOID, REDUCED DIPHTHERIA TOXOID AND ACELLULAR PERTUSSIS VACCINE, ADSORBED 5; 2.5; 8; 8; 2.5 [IU]/.5ML; [IU]/.5ML; UG/.5ML; UG/.5ML; UG/.5ML
0.5 SUSPENSION INTRAMUSCULAR ONCE
Qty: 0 | Refills: 0 | Status: COMPLETED | OUTPATIENT
Start: 2019-04-12 | End: 2019-04-12

## 2019-04-12 RX ADMIN — Medication 650 MILLIGRAM(S): at 00:00

## 2019-04-12 RX ADMIN — TETANUS TOXOID, REDUCED DIPHTHERIA TOXOID AND ACELLULAR PERTUSSIS VACCINE, ADSORBED 0.5 MILLILITER(S): 5; 2.5; 8; 8; 2.5 SUSPENSION INTRAMUSCULAR at 02:30

## 2019-04-12 NOTE — CONSULT NOTE ADULT - ADDITIONAL PE
Examination of the face revealed the followin.) Right forehead laceration measuring 5cm in length with a wide gap and extensive amount of foreign body debris. The skin, subcutaneous tissue and periosteum was violated with exposed anterior table of the cranium. 2.) Right eyebrow, eyelid laceration with a flap through the temporalis muscle and orbicularis oculi muscle measuring 7cm in length with a large avulsion flap and gap of 3cm. There was extensive foreign body noted. There was exposed orbicularis oculi muscle with bleeding coming from the arterial branch of the superior orbital arcade. 3.) Right cheek laceration over the zygomatic region which measured 4cm in length with a 1cm gap. Sensation of the right forehead was diminished. The motor of the right eyebrow was also absent on examination due to the violation of the temporalis muscle and orbicularis muscle. patient had no vision issues and the anterior lamella was not violated of the upper eyelid.

## 2019-04-12 NOTE — ED PROVIDER NOTE - OBJECTIVE STATEMENT
80 y/o F with PMHx of DM and HTN BIBEMS c/o laceration to the face. Pt states she was reaching for a glass vase, the vase fell, hit her in the face, and shattered. Pt sustained one large laceration to the R forehead and multiple small lacerations. Pt denies LOC. Pt currently c/o mild HA, pain and bleeding from laceration site. Pt denies change in vision, eye pain, difficulty moving eyes, CP, SOB, numbness, paresthesias, weakness, difficulty walking (ambulates independently), AC use (only daily baby ASA), other lacerations. 78 y/o F with PMHx of DM and HTN BIBEMS c/o laceration to the face. Pt states she was reaching for a glass vase, the vase fell, hit her in the face, and shattered. Pt sustained one large laceration to the R forehead and multiple small lacerations. Pt denies LOC. Pt currently c/o mild HA, pain and bleeding from laceration site. Pt denies change in vision, eye pain, difficulty moving eyes, CP, SOB, numbness, paresthesias, weakness, difficulty walking (ambulates independently), vomiting, amnesia, AC use (only daily baby ASA), other lacerations.

## 2019-04-12 NOTE — ED PROVIDER NOTE - PLAN OF CARE
1) Follow-up with your PCP in 2-3 days. Follow-up with Dr. Patterson (Plastic surgery) as directed.  2) Rest. Take Tylenol as directed for pain. Care for wound as directed by the plastic surgeon.  3) Return to the ER for any new or worsening symptoms.

## 2019-04-12 NOTE — CONSULT NOTE ADULT - SUBJECTIVE AND OBJECTIVE BOX
Patient is a 79 year old female who was cleaning her china cabinet earlier today and a vase fell onto her face. The vas fell onto her head and she did not lose consciousness. She did suffer multiple large lacerations to the forehead, right eyebrow and eyelid, right cheek with extensive amount of foreign body in the wound bed. The patient was bleeding profusely and then presented to the [facility] ED. On evaluation a CT scan was done which did not show any fractures or bleed. The patient was bleeding in the ED and plastic surgery was consulted for further evaluation. On evaluation the patient had pain in her right forehead, she was unable to completely raise her right eyebrow due to the injury of the muscle. She has no complaints of vison loss or diplopia. The patient was bleeding in the ED. She had sensation diminished in the right forehead region.     PAST MEDICAL & SURGICAL HISTORY:  DM2 (diabetes mellitus, type 2)  HLD (hyperlipidemia)  HTN (hypertension)  S/P cholecystectomy    FAMILY HISTORY:  None    Home Medications:  gabapentin 100 mg oral capsule: orally once a day (23 Jun 2018 11:42)  Januvia 100 mg oral tablet: 0.5 cap(s) orally once a day (23 Jun 2018 11:42)  metFORMIN 1000 mg oral tablet: 1 tab(s) orally 2 times a day (23 Jun 2018 11:42)  methIMAzole 5 mg oral tablet: 1 tab(s) orally once a day (23 Jun 2018 11:42)  simvastatin 40 mg oral tablet: 1 tab(s) orally once a day (at bedtime) (23 Jun 2018 11:42)  timolol maleate 0.5% ophthalmic solution: 1 drop(s) to each affected eye 2 times a day (25 Jun 2018 12:33)    ICU Vital Signs Last 24 Hrs  T(C): 36.8 (12 Apr 2019 01:24), Max: 36.8 (11 Apr 2019 21:22)  T(F): 98.2 (12 Apr 2019 01:24), Max: 98.2 (11 Apr 2019 21:22)  HR: 88 (12 Apr 2019 01:24) (75 - 88)  BP: 124/80 (12 Apr 2019 01:24) (124/80 - 143/84)  BP(mean): --  ABP: --  ABP(mean): --  RR: 16 (12 Apr 2019 01:24) (16 - 16)  SpO2: 97% (12 Apr 2019 01:24) (96% - 97%)

## 2019-04-12 NOTE — ED PROVIDER NOTE - CARE PLAN
Principal Discharge DX:	Laceration of forehead, initial encounter  Assessment and plan of treatment:	1) Follow-up with your PCP in 2-3 days. Follow-up with Dr. Patterson (Plastic surgery) as directed.  2) Rest. Take Tylenol as directed for pain. Care for wound as directed by the plastic surgeon.  3) Return to the ER for any new or worsening symptoms.

## 2019-04-12 NOTE — ED PROVIDER NOTE - CLINICAL SUMMARY MEDICAL DECISION MAKING FREE TEXT BOX
Pt sustained trauma to head when a glass jar fell and broke on impact to forehead causing a large y shaped deep complex laceration one right forehead and face No LOC Not on anti coagulants non focal neuro exam CT scan head neg Pt and family requesting plastic to repair  the lac Dr Patterson on call contacted --Neal

## 2019-04-12 NOTE — ED PROVIDER NOTE - PROGRESS NOTE DETAILS
Pt prefers plastics surgery sutures the wound. C/d/w Dr. Patterson (on-call plastics attending), plastic surgeon will be down to see pt, will gather supplies. -Román Olivarez PA-C Lac repaired by plastics, keflex Rx'd as per their request.  D/C.  --[facility]

## 2019-04-12 NOTE — ED PROVIDER NOTE - PHYSICAL EXAMINATION
GEN: Pt in NAD, A&O x3.  PSYCH: Affect appropriate.  EYES: Sclera white w/o injection, no periorbital ecchymosis, no eyelid laceration, PERRLA, EOMI.  ENT: Large 8cm partial thickness laceration to R forehead, V shaped extending the eyebrow to the hairline, active bleeding -gauze applied and wrapped in cling bleeding was controlled. Nose w/o deformity, no epistaxis, no DC. No auricular TTP, no Elena's sign, no DC. MMM. Neck supple FROM.   RESP: No chest wall tenderness, CTA b/l, no wheezes, rales, or rhonchi.   CARDIAC: RRR, clear distinct S1, S2, no appreciable murmurs.  ABD: Abdomen soft, non-tender. No CVAT b/l.  VASC: No edema or calf tenderness.  SKIN: No rashes on the trunk. GEN: Pt in NAD, A&O x3.  PSYCH: Affect appropriate.  EYES: Sclera white w/o injection, no periorbital ecchymosis, no eyelid laceration, PERRLA, EOMI.  ENT: Large 8cm partial thickness laceration to R forehead, V shaped extending the eyebrow to the hairline, with flap, several 2cm laceration on the L forehead an one on the buccal area, active bleeding -gauze applied and wrapped in cling bleeding was controlled. Nose w/o deformity, no epistaxis, no DC. No auricular TTP, no Elena's sign, no DC. MMM. Neck supple FROM.   RESP: No chest wall tenderness, CTA b/l, no wheezes, rales, or rhonchi.   CARDIAC: RRR, clear distinct S1, S2, no appreciable murmurs.  ABD: Abdomen soft, non-tender. No CVAT b/l.  VASC: No edema or calf tenderness.  SKIN: No rashes on the trunk. GEN: Pt in NAD, A&O x3.  PSYCH: Affect appropriate.  EYES: Sclera white w/o injection, no periorbital ecchymosis, no eyelid laceration, PERRLA, EOMI.  ENT: Large 8cm partial thickness laceration to R forehead, V shaped extending the eyebrow to the hairline, with flap, several 2cm laceration on the L forehead an one on the buccal area, active bleeding -gauze applied and wrapped in cling bleeding was controlled. Nose w/o deformity, no epistaxis, no DC. No auricular TTP, no Elena's sign, no DC. MMM. Neck supple FROM.   RESP: No chest wall tenderness, CTA b/l, no wheezes, rales, or rhonchi.   CARDIAC: RRR, clear distinct S1, S2, no appreciable murmurs.  ABD: Abdomen soft, non-tender. No CVAT b/l.  VASC: No edema or calf tenderness.  NEURO: Difficulty moving R eyebrow due to pain and laceration through eyebrow, lateral 1/3 moves well. Otherwise no focal motor or sensory deficits.  SKIN: No rashes on the trunk.

## 2019-04-12 NOTE — ED PROVIDER NOTE - NSFOLLOWUPINSTRUCTIONS_ED_ALL_ED_FT
1) Follow-up with your PCP in 2-3 days. Follow-up with Dr. Patterson (Plastic surgery) as directed.    2) Rest. Take Tylenol as directed for pain. Care for wound as directed by the plastic surgeon.    3) Return to the ER for any new or worsening symptoms, redness, swelling, or discharge from the wound, severe headache, vomiting, change in vision, numbness, tingling, weakness, or any other concerns.

## 2019-04-12 NOTE — ED PROVIDER NOTE - ATTENDING CONTRIBUTION TO CARE
I have seen and evaluated this patient with the advance practice clinician.   I agree with the findings  unless other wise stated. I have amended notes where needed.  After my face to face bedside evaluation, I am noting: Pt sustained trauma to head when a glass jar fell and broke on impact to forehead causing a large y shaped deep complex laceration one right forehead and face No LOC Not on anti coagulants non focal neuro exam CT scan head neg Pt and family requesting plastic to repair  the lac Dr Patterson on call contacted --Neal

## 2019-04-12 NOTE — CONSULT NOTE ADULT - ASSESSMENT
79 year old female with multiple complex lacerations, avulsion flaps of the right forehead, eyebrow, eyelid and right cheek. Sensory and motor was diminished or absent on examination due to the muscle injuries and sensory nerve injuries. I explained to the patient the risks, benefits and alternatives of undergoing repair of these lacerations along with an extensive washout at bedside. The risks discussed were infection, bleeding, pain, scarring, asymmetry, nerve injury, sensory loss, motor loss, cosmetic deformity, need for further surgery and wound healing issues.    1.) Lacerations repaired in the ED (See Op Note)  2.) Keflex 500mg PO BID x 5 days  3.) Tetanus shot  4.) Keep dressing clean and dry, do not remove for 1 week  5.) No strenuous activity or lifting, exercise for 1 week  6.) Sleep with HOB elevated   7.) Follow up in 7-10 days for a wound check and suture removal

## 2019-08-20 NOTE — ED ADULT NURSE NOTE - NSFALLRSKPASTHIST_ED_ALL_ED
[address] [facility],[address]  [address]   [address] [facility] [address] [facility]   [phone] Office   [phone] Fax           Date:  19     Name:  Henry Trujillo  :  1999  MRN:  [id number]     PCP:  Yumiko Morales MD    Chief Complaint   Patient presents with    Migraine     HISTORY OF PRESENT ILLNESS: Follow up for migraine headaches. At her last office visit with Dr. Rosemary Phillips, she was referred to rheumatology for her joint pain, etc., and was told that she has hypermobility syndrome. Only recommendation for this has been to exercise and she does not have a follow up unless it gets worse. With regard to the headaches, these are getting worse because she is not on a preventative at this point. Tried Treximet and [employer] with minimal benefit. Kirti Garcia works. Zomig had some nasal discomfort. Patient previously tried Nortriptyline (vision changes), amitriptyline caused an increase in anxiety and personality changes, Ajovy (pruritus, rash, and injection site reaction) and Topamax (slowness in thinking and increased her pain). Headache Characterization: throbbing and pulsating, ice pick sensation  Aura: Trouble speaking, visual disturbance, sometimes she is achy, changes in bowel, tingling especially head/face, hands. Sometimes she will be irritable. Frequency/Length: they occur daily, she might get a gap for a few hours  Location: left back side of the head  Nausea/Vomiting: yes/occasionally  Photophobia:  yes  Phonophobia: yes  Provoking factors: dehydration  Relieving factors: dark room, quiet, Zipsor but not more than twice per week  Focal neurologic symptoms with headache: generalized weakness, vertigo/dizziness, muscle tension    Except as noted above, denies  fever, chills, cough. No CP or SOB. No dysuria, loss of bowel or bladder control. No Weight loss. Appetite good. Sleeping well. No sweats.   No edema. No bruising or bleeding. No nausea or vomit. No diarrhea. No frequency, urgency, No depressive sxs. No anxiety. Denies sore throat, nasal congestion, nasal discharge, epistaxis, tinnitus, hearing loss, back pain, muscle pain, or joint pain. Current Outpatient Medications   Medication Sig    diclofenac potassium (ZIPSOR) 25 mg capsule Take 1 tab prn at onset of headache    magnesium oxide (MAG-OX) 400 mg tablet Take 400 mg by mouth daily.  cyanocobalamin (VITAMIN B-12) 500 mcg tablet Take 500 mcg by mouth daily.  promethazine (PHENERGAN) 12.5 mg tablet Take 1 Tab by mouth every six (6) hours as needed for Nausea. No current facility-administered medications for this visit. Allergies   Allergen Reactions    Ajovy [Fremanezumab-Vfrm] Rash and Swelling     Possible seizure.  Minocycline Rash     Fever. Headaches. Past Medical History:   Diagnosis Date    H/O seasonal allergies     Headache      History reviewed. No pertinent surgical history.   Social History     Socioeconomic History    Marital status: SINGLE     Spouse name: Not on file    Number of children: Not on file    Years of education: Not on file    Highest education level: Not on file   Occupational History    Not on file   Social Needs    Financial resource strain: Not on file    Food insecurity:     Worry: Not on file     Inability: Not on file    Transportation needs:     Medical: Not on file     Non-medical: Not on file   Tobacco Use    Smoking status: Never Smoker    Smokeless tobacco: Never Used   Substance and Sexual Activity    Alcohol use: No    Drug use: No    Sexual activity: Never   Lifestyle    Physical activity:     Days per week: Not on file     Minutes per session: Not on file    Stress: Not on file   Relationships    Social connections:     Talks on phone: Not on file     Gets together: Not on file     Attends Cheondoism service: Not on file     Active member of club or organization: Not on file     Attends meetings of clubs or organizations: Not on file     Relationship status: Not on file    Intimate partner violence:     Fear of current or ex partner: Not on file     Emotionally abused: Not on file     Physically abused: Not on file     Forced sexual activity: Not on file   Other Topics Concern    Not on file   Social History Narrative    Not on file     Family History   Problem Relation Age of Onset    No Known Problems Mother     Hypertension Father        PHYSICAL EXAMINATION:    Visit Vitals  /76   Pulse 69   Resp 20   Ht 5' 2.5\" (1.588 m)   SpO2 98%   BMI 19.62 kg/m²     General:  Well defined, nourished, and groomed individual in no acute distress. Neck: Supple, nontender, no bruits, no pain with resistance to active range of motion. Heart: Regular rate and rhythm, no murmurs, rub, or gallop. Normal S1S2. Lungs:  Clear to auscultation bilaterally with equal chest expansion, no cough, no wheeze  Musculoskeletal:  Extremities revealed no edema and had full range of motion of joints. Psych:  Good mood and bright affect    NEUROLOGICAL EXAMINATION:     Mental Status:   Alert and oriented to person, place, and time     Cranial Nerves:  I: smell Not tested   II: visual fields Full to confrontation   II: pupils Equal, round, reactive to light   II: optic disc No papilledema   III,VII: ptosis none   III,IV,VI: extraocular muscles  Full ROM   V: mastication normal   V: facial light touch sensation  normal   VII: facial muscle function   symmetric   VIII: hearing symmetric   IX: soft palate elevation  normal   XI: trapezius strength  5/5   XI: sternocleidomastoid strength 5/5   XI: neck flexion strength  5/5   XII: tongue  midline     Motor Examination: Normal tone, bulk, and strength, 5/5 muscle strength throughout. Coordination:  Finger to nose was normal.   No resting or intention tremor    Gait and Station:  Steady while walking. Normal arm swing.   No pronator drift. No muscle wasting or fasiculations noted. Reflexes:  DTRs 2+ throughout. ASSESSMENT AND PLAN    ICD-10-CM ICD-9-CM    1. Intractable migraine with aura with status migrainosus G43. 111 346.03 FLUoxetine (PROZAC) 20 mg tablet      FLUoxetine (PROZAC) 20 mg tablet     Discussed migraine and migraine prevention to include a different CGRP inhibitor. However, through our discussion it appears that she has a lot of anxiety in general and this is certainly a trigger for her headaches. Discussed potentially using Prozac or Effexor XR for prevention as both have been studied and appear effective in this regard and may be of some benefit with regard to managing her stress and anxiety better. She has elected to start Prozac. Purpose and potential side effects were discussed and she has verbalized understanding. Follow up with Dr. Mariee Record as already planned. Corinne Redding no

## 2020-08-04 ENCOUNTER — RESULT REVIEW (OUTPATIENT)
Age: 80
End: 2020-08-04

## 2020-09-16 NOTE — ED PROVIDER NOTE - CONSTITUTIONAL NEGATIVE STATEMENT, MLM
no fever and no chills. Cheek Interpolation Flap Text: A decision was made to reconstruct the defect utilizing an interpolation axial flap and a staged reconstruction.  A telfa template was made of the defect.  This telfa template was then used to outline the Cheek Interpolation flap.  The donor area for the pedicle flap was then injected with anesthesia.  The flap was excised through the skin and subcutaneous tissue down to the layer of the underlying musculature.  The interpolation flap was carefully excised within this deep plane to maintain its blood supply.  The edges of the donor site were undermined.   The donor site was closed in a primary fashion.  The pedicle was then rotated into position and sutured.  Once the tube was sutured into place, adequate blood supply was confirmed with blanching and refill.  The pedicle was then wrapped with xeroform gauze and dressed appropriately with a telfa and gauze bandage to ensure continued blood supply and protect the attached pedicle.

## 2021-01-13 NOTE — ED ADULT TRIAGE NOTE - TEMPERATURE IN CELSIUS (DEGREES C)
Fairly well controlled with the last LDL of 88 last year. She is due to have it checked next week again. Continue current statin therapy. 36.7

## 2021-05-17 NOTE — CONSULT NOTE ADULT - NEGATIVE OPHTHALMOLOGIC SYMPTOMS
NEW PATIENT VISIT    I am seeing the patient at the request of Nita Wood NP    CHIEF COMPLAINT: left elbow pain x 2-3 months    HISTORY of PRESENT ILLNESS:   c/o left elbow pain, started abruptly after a fall 2-3 months ago  Located lateral aspect of the elbow  Constant, worse with use  Elbow clunks with use  Denies numbness and tingling   Has tried therapy, bracing and nsaids without relief  Denies numbness and tingling in the fingers    TREATMENT HISTORY:   see hpi    REVIEW of SYSTEMS:   Numbness or tingling, weakness, not addressed in HPI:denies   Arthritis or joint pain, other bone or joint complaints:denies    PAST MEDICAL HISTORY:   Allergies and current medications reviewed in nurse's notes above.   Illnesses related to chief complaint:denies   Surgeries related to chief complaint:denies    RECORD REVIEW:   Nita Wood note reviewed    EXAMINATION:     Vitals:    05/11/21 0923   Pulse: 78     General: comfortable, alert and oriented x 3     LUE:  No visible edema or deformity  Full arom fingers, wrist and elbow   Pain with resisted finger and wrist extension  Palpable clunking radial head with extension of the elbow in supination  Tenderness lateral elbow in area of lucl and extensor tendon origin  nontender medial elbow  nontender olecranon  Left hand nv intact to light touch with rapid capillary refill    DIAGNOSTIC STUDIES:   none    IMAGING STUDIES:   xr elbow:  No visible fracture  Mild degenerative changes    IMPRESSION:   Lateral ulnar collateral ligament injury with instability of the elbow    PLAN:  MRI elbow to evaluated lateral elbow ligaments  Light assist arm  Wrist brace  RTC after mri    20 minutes spent with patient    Rian Hylton PA-C  5/17/2021   no discharge R/no photophobia/no diplopia

## 2021-08-24 NOTE — DISCHARGE NOTE ADULT - SECONDARY DIAGNOSIS.
Patient Seen in: Immediate [address] [place] [address]      History   Patient presents with:  Testing    Stated Complaint: exposed to CV 19    HPI/Subjective:   72year old female  Presents to the immediate care for a Covid 19 test.  Patient had recent exposure to the C for stated complaint: exposed to CV 19  Other systems are as noted in HPI. Constitutional and vital signs reviewed. All other systems reviewed and negative except as noted above.     Physical Exam     ED Triage Vitals [08/24/21 1449]   /69   Pul MD  [address] [facility],[address]  [facility] [id number]                Medications Prescribed:  Current Discharge Medication List HTN (hypertension) HLD (hyperlipidemia) Unable to walk Hyponatremia DM2 (diabetes mellitus, type 2) Hyperthyroidism

## 2022-03-25 NOTE — ED CLERICAL - BED REQUESTED
23-Jun-2018 05:45 Admit to Orthopaedic Service.  Presents today for elective right TSR  Pt medically stable and cleared for procedure today by Dr. Floyd

## 2023-01-18 ENCOUNTER — APPOINTMENT (OUTPATIENT)
Dept: PODIATRY | Facility: CLINIC | Age: 83
End: 2023-01-18
Payer: MEDICARE

## 2023-01-18 DIAGNOSIS — L85.1 ACQUIRED KERATOSIS [KERATODERMA] PALMARIS ET PLANTARIS: ICD-10-CM

## 2023-01-18 DIAGNOSIS — L60.2 ONYCHOGRYPHOSIS: ICD-10-CM

## 2023-01-18 DIAGNOSIS — E11.49 TYPE 2 DIABETES MELLITUS WITH OTHER DIABETIC NEUROLOGICAL COMPLICATION: ICD-10-CM

## 2023-01-18 DIAGNOSIS — I73.9 PERIPHERAL VASCULAR DISEASE, UNSPECIFIED: ICD-10-CM

## 2023-01-18 DIAGNOSIS — B35.1 TINEA UNGUIUM: ICD-10-CM

## 2023-01-18 PROCEDURE — 11056 PARNG/CUTG B9 HYPRKR LES 2-4: CPT

## 2023-01-18 PROCEDURE — 99203 OFFICE O/P NEW LOW 30 MIN: CPT | Mod: 25

## 2023-01-18 PROCEDURE — 11721 DEBRIDE NAIL 6 OR MORE: CPT | Mod: 59

## 2023-01-27 NOTE — ASSESSMENT
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  Onychomycosis, painful (B35.1) (M79).  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  Difficulty walking (R26.2).  Peripheral vascular disease (I73.9).\par \par Treatment: The painful thickened mycotic nails were debrided and ground thin, both manually and mechanically.  IPK's were debrided and enucleated.  Dispersion padding applied.  Patient was given home care instructions and education on care of the diabetic foot.\par Any questions or problems, patient is to contact the office.

## 2023-01-27 NOTE — CONSULT LETTER
[Dear  ___] : Dear  [unfilled], [Consult Letter:] : I had the pleasure of evaluating your patient, [unfilled]. [Please see my note below.] : Please see my note below. [Consult Closing:] : Thank you very much for allowing me to participate in the care of this patient.  If you have any questions, please do not hesitate to contact me. [Sincerely,] : Sincerely, [FreeTextEntry2] : Israel Lyon MD\par [address] [place] [address]\[place] [address] [FreeTextEntry3] : Charles Lombardi, DPM

## 2023-01-27 NOTE — PHYSICAL EXAM
[Delayed in the Right Toes] : capillary refills delayed in the right toes [Delayed in the Left Toes] : capillary refills delayed in the left toes [0] : left foot posterior tibialis 0 [1+] : left foot dorsalis pedis 1+ [Deep Tendon Reflexes (DTR)] : deep tendon reflexes were 2+ and symmetric [Vibration Dec.] : diminished vibratory sensation at the level of the toes [Diminished Throughout Right Foot] : diminished sensation with monofilament testing throughout right foot [Diminished Throughout Left Foot] : diminished sensation with monofilament testing throughout left foot [General Appearance - Alert] : alert [Oriented To Time, Place, And Person] : oriented to person, place, and time [de-identified] : Forefoot varus, fully compensated.  HAV with bunion. [Position Sense Dec.] : normal position sense at the level of the toes [FreeTextEntry1] : Decreased sharp/dull sensations.  Q8 - The patient has class findings of Q8 - Two Class B findings.

## 2023-01-27 NOTE — HISTORY OF PRESENT ILLNESS
[Sneakers] : lucy [FreeTextEntry1] : 82 year old female presents today with her son for at-risk foot care.  She saw Dr. Lyon one week ago.  A1c: 6%.  She presents with painful, thickened mycotic nails x 10 with loosening of the right 4th toenail.  She has multiple IPK's, plantar aspect of the foot and distal aspect of the 2nd toe.  Patient has difficulty ambulating due to pain and discomfort.

## 2023-02-28 ENCOUNTER — NON-APPOINTMENT (OUTPATIENT)
Age: 83
End: 2023-02-28

## 2023-02-28 ENCOUNTER — APPOINTMENT (OUTPATIENT)
Dept: ORTHOPEDIC SURGERY | Facility: CLINIC | Age: 83
End: 2023-02-28
Payer: MEDICARE

## 2023-02-28 VITALS
BODY MASS INDEX: 28.71 KG/M2 | HEART RATE: 76 BPM | HEIGHT: 62 IN | WEIGHT: 156 LBS | DIASTOLIC BLOOD PRESSURE: 72 MMHG | SYSTOLIC BLOOD PRESSURE: 130 MMHG

## 2023-02-28 DIAGNOSIS — M25.561 PAIN IN RIGHT KNEE: ICD-10-CM

## 2023-02-28 DIAGNOSIS — M17.0 BILATERAL PRIMARY OSTEOARTHRITIS OF KNEE: ICD-10-CM

## 2023-02-28 PROCEDURE — 99204 OFFICE O/P NEW MOD 45 MIN: CPT

## 2023-02-28 NOTE — END OF VISIT
[Time Spent: ___ minutes] : I have spent [unfilled] minutes of time on the encounter. [FreeTextEntry3] : I, Dr. Toby Oden MD, personally performed the evaluation and management services for this established patient who presented today with a new problem/exacerbation of an existing condition. That E/M includes conducting the examination, assessing all new/exacerbated conditions, and establishing a new plan of care. Today, MY ACP was here to assist with recording the history in my evaluation and management services of this new problem/exacerbated condition to be followed going forward.\par

## 2023-02-28 NOTE — PHYSICAL EXAM
[Antalgic] : antalgic [LE] : Sensory: Intact in bilateral lower extremities [ALL] : dorsalis pedis, posterior tibial, femoral, popliteal, and radial 2+ and symmetric bilaterally [de-identified] : On physical examination of both the left and right knee.  The right is worse than the left.  The skin is clean dry and intact with no areas of redness swelling or heat noted.  There is diffuse pain and tenderness primarily in the patellofemoral as well as the medial femoral-tibial compartment.  There is a decrease in her range of motion.  As she has a lack of extension of 10 degrees in both the left and right knee.  And flexion is limited to approximately 100 degrees.  There is no evidence of ligamentous laxity.  No evidence of any muscle atrophy.  No evidence of any motor or sensory deficit.  Patient has good distal pulses with no calf tenderness. [de-identified] : No x-rays were performed in today's office visit.  However the patient did bring in copies of her previous x-rays.  It shows osteoarthritic changes with narrowing of the joint spacing.  There is also osteophyte formation as well as areas of sclerosis in both the left and right knee compatible with DJD.  There is no evidence of any fracture or dislocation.

## 2023-02-28 NOTE — HISTORY OF PRESENT ILLNESS
[de-identified] : Patient is an 82-year-old female who presents today for an evaluation of both the left and right knee.  She states that she has had pain for several months and is progressively getting worse.  She describes the pain as a constant and persistent pain which occurs with strenuous activities including standing walking or even going up and down stairs.  She states that she did seek medical attention with an orthopedist.  Who tried conservative management.  This also includes viscosupplementation injection as well as cortisone injections.  Although she did have her last viscosupplementation injection months ago.  She states that she did not feel any improvement.  And states that she has tried cortisone injection with the last one being performed 1 week ago.  With minimal improvement.  She states that she was explained in the past that due to the arthritic changes that she may require a total knee replacement.  She is tried physical therapy exercises and anti-inflammatories in the past with minimal disc improvement.  She denies any specific injury or accidents.

## 2023-02-28 NOTE — DISCUSSION/SUMMARY
[de-identified] : After thorough history full examination and review of the x-rays.  It was explained to the patient that she does have osteoarthritic changes in both the left and right knee.  She was explained the different modalities of treatment which include conservative versus surgical intervention.  However as she has had no great success with conservative management it was explained that total knee replacements is in the foreseeable future.  However the patient states that she would like to continue with a good exercise program.  As well as outpatient physical therapy.  Therefore she was given a prescription and was advised that she may try an additional viscosupplementation injection once the effects of the cortisone is no longer in effect.  She was explained the risk benefits pros and cons of the injection as well as alternatives.  She was also explained that there is no guarantee for complete relief and that this is not a cure for the osteoarthritis.  She did understand and will return once the pain in her knee worsens. \par \par I, Dr. Oden, personally performed the evaluation and management services for this established patient who presents today with an orthopedic condition. The evaluation and management includes conducting the conditions and establishing a plan of care.\par \par Today, my ACP Juan [facility], was here to assist with the patient in my evaluation and management services for this condition which will be followed going forward.\par \par 45 minutes were spent, face to face, in direct consultation with the patient. This includes reviewing the natural history of their Dx., eliciting the history, performing an orthopedic exam, review of the x-ray findings, forming a differential Dx and discussing all treatment options. This Includes both surgical and non-surgical treatments. I also reviewed all the risks and benefits of non-operative & operative Tx options, future impact into orthopedic functions/problems, activity restrictions both at home and at work, and all follow up requirements.\par \par

## 2024-05-02 NOTE — H&P ADULT - NSHPREVIEWOFSYSTEMS_GEN_ALL_CORE
PATIENT INSTRUCTIONS FOR EAR WAX BUILDUP (A.K.A. CERUMEN)    To clean the ears, wash the external ear with a cloth, but do not insert anything into the ear canal.  Most cases of ear wax blockage respond to home treatments used to soften wax.   You may try placing a few drops of mineral oil or baby oil once a week to help keep the wax soft. We do NOT recommend placing ANYTHING in the ear canal. Doing so may cause wax to be pushed back into the ear canal and stuck. It also risks injury to the ear canal and ear drum. We recommend avoiding using cotton tipped applicators, tissues, paper, or any rigid object in the ear canal.   Some people require regular cleanings every year or so to keep the ear canals open.    REVIEW OF SYSTEMS:  GEN: no fever,    no chills  RESP: no SOB,   no cough  CVS: no chest pain,   no palpitations  GI: no abdominal pain,   no nausea,   no vomiting,   no constipation,   no diarrhea  : no dysuria,   no frequency  NEURO: no headache,   no dizziness  PSYCH: no depression,   not anxious  Derm : no rash

## 2024-10-07 ENCOUNTER — EMERGENCY (EMERGENCY)
Facility: HOSPITAL | Age: 84
LOS: 1 days | Discharge: HOME | End: 2024-10-07
Attending: EMERGENCY MEDICINE
Payer: MEDICARE

## 2024-10-07 VITALS
TEMPERATURE: 98 F | HEIGHT: 63 IN | DIASTOLIC BLOOD PRESSURE: 86 MMHG | HEART RATE: 82 BPM | OXYGEN SATURATION: 100 % | SYSTOLIC BLOOD PRESSURE: 174 MMHG | WEIGHT: 166.89 LBS | RESPIRATION RATE: 16 BRPM

## 2024-10-07 VITALS
SYSTOLIC BLOOD PRESSURE: 147 MMHG | HEART RATE: 83 BPM | DIASTOLIC BLOOD PRESSURE: 69 MMHG | OXYGEN SATURATION: 97 % | TEMPERATURE: 98 F | RESPIRATION RATE: 18 BRPM

## 2024-10-07 DIAGNOSIS — Z90.49 ACQUIRED ABSENCE OF OTHER SPECIFIED PARTS OF DIGESTIVE TRACT: Chronic | ICD-10-CM

## 2024-10-07 LAB
ALBUMIN SERPL ELPH-MCNC: 3.8 G/DL — SIGNIFICANT CHANGE UP (ref 3.3–5)
ALBUMIN SERPL ELPH-MCNC: 4.3 G/DL — SIGNIFICANT CHANGE UP (ref 3.3–5)
ALP SERPL-CCNC: 87 U/L — SIGNIFICANT CHANGE UP (ref 40–120)
ALP SERPL-CCNC: 97 U/L — SIGNIFICANT CHANGE UP (ref 40–120)
ALT FLD-CCNC: 21 U/L — SIGNIFICANT CHANGE UP (ref 10–45)
ALT FLD-CCNC: 21 U/L — SIGNIFICANT CHANGE UP (ref 10–45)
ANION GAP SERPL CALC-SCNC: 13 MMOL/L — SIGNIFICANT CHANGE UP (ref 5–17)
ANION GAP SERPL CALC-SCNC: 14 MMOL/L — SIGNIFICANT CHANGE UP (ref 5–17)
ANION GAP SERPL CALC-SCNC: 15 MMOL/L — SIGNIFICANT CHANGE UP (ref 5–17)
ANION GAP SERPL CALC-SCNC: 16 MMOL/L — SIGNIFICANT CHANGE UP (ref 5–17)
APPEARANCE UR: CLEAR — SIGNIFICANT CHANGE UP
APTT BLD: 33.2 SEC — SIGNIFICANT CHANGE UP (ref 24.5–35.6)
AST SERPL-CCNC: 26 U/L — SIGNIFICANT CHANGE UP (ref 10–40)
AST SERPL-CCNC: 41 U/L — HIGH (ref 10–40)
BACTERIA # UR AUTO: NEGATIVE /HPF — SIGNIFICANT CHANGE UP
BASOPHILS # BLD AUTO: 0.05 K/UL — SIGNIFICANT CHANGE UP (ref 0–0.2)
BASOPHILS NFR BLD AUTO: 0.4 % — SIGNIFICANT CHANGE UP (ref 0–2)
BILIRUB SERPL-MCNC: 0.4 MG/DL — SIGNIFICANT CHANGE UP (ref 0.2–1.2)
BILIRUB SERPL-MCNC: 0.4 MG/DL — SIGNIFICANT CHANGE UP (ref 0.2–1.2)
BILIRUB UR-MCNC: NEGATIVE — SIGNIFICANT CHANGE UP
BUN SERPL-MCNC: 27 MG/DL — HIGH (ref 7–23)
BUN SERPL-MCNC: 28 MG/DL — HIGH (ref 7–23)
BUN SERPL-MCNC: 29 MG/DL — HIGH (ref 7–23)
BUN SERPL-MCNC: 32 MG/DL — HIGH (ref 7–23)
CALCIUM SERPL-MCNC: 10.6 MG/DL — HIGH (ref 8.4–10.5)
CALCIUM SERPL-MCNC: 9.3 MG/DL — SIGNIFICANT CHANGE UP (ref 8.4–10.5)
CALCIUM SERPL-MCNC: 9.3 MG/DL — SIGNIFICANT CHANGE UP (ref 8.4–10.5)
CALCIUM SERPL-MCNC: 9.5 MG/DL — SIGNIFICANT CHANGE UP (ref 8.4–10.5)
CAST: 0 /LPF — SIGNIFICANT CHANGE UP (ref 0–4)
CHLORIDE SERPL-SCNC: 97 MMOL/L — SIGNIFICANT CHANGE UP (ref 96–108)
CHLORIDE SERPL-SCNC: 98 MMOL/L — SIGNIFICANT CHANGE UP (ref 96–108)
CHLORIDE SERPL-SCNC: 98 MMOL/L — SIGNIFICANT CHANGE UP (ref 96–108)
CHLORIDE SERPL-SCNC: 99 MMOL/L — SIGNIFICANT CHANGE UP (ref 96–108)
CO2 SERPL-SCNC: 19 MMOL/L — LOW (ref 22–31)
CO2 SERPL-SCNC: 22 MMOL/L — SIGNIFICANT CHANGE UP (ref 22–31)
CO2 SERPL-SCNC: 23 MMOL/L — SIGNIFICANT CHANGE UP (ref 22–31)
CO2 SERPL-SCNC: 26 MMOL/L — SIGNIFICANT CHANGE UP (ref 22–31)
COLOR SPEC: YELLOW — SIGNIFICANT CHANGE UP
CREAT SERPL-MCNC: 0.97 MG/DL — SIGNIFICANT CHANGE UP (ref 0.5–1.3)
CREAT SERPL-MCNC: 1 MG/DL — SIGNIFICANT CHANGE UP (ref 0.5–1.3)
CREAT SERPL-MCNC: 1.04 MG/DL — SIGNIFICANT CHANGE UP (ref 0.5–1.3)
CREAT SERPL-MCNC: 1.14 MG/DL — SIGNIFICANT CHANGE UP (ref 0.5–1.3)
DIFF PNL FLD: NEGATIVE — SIGNIFICANT CHANGE UP
EGFR: 47 ML/MIN/1.73M2 — LOW
EGFR: 53 ML/MIN/1.73M2 — LOW
EGFR: 56 ML/MIN/1.73M2 — LOW
EGFR: 58 ML/MIN/1.73M2 — LOW
EOSINOPHIL # BLD AUTO: 0.14 K/UL — SIGNIFICANT CHANGE UP (ref 0–0.5)
EOSINOPHIL NFR BLD AUTO: 1.2 % — SIGNIFICANT CHANGE UP (ref 0–6)
GAS PNL BLDV: SIGNIFICANT CHANGE UP
GLUCOSE SERPL-MCNC: 129 MG/DL — HIGH (ref 70–99)
GLUCOSE SERPL-MCNC: 132 MG/DL — HIGH (ref 70–99)
GLUCOSE SERPL-MCNC: 160 MG/DL — HIGH (ref 70–99)
GLUCOSE SERPL-MCNC: 202 MG/DL — HIGH (ref 70–99)
GLUCOSE UR QL: >=1000 MG/DL
HCT VFR BLD CALC: 42.2 % — SIGNIFICANT CHANGE UP (ref 34.5–45)
HGB BLD-MCNC: 13.2 G/DL — SIGNIFICANT CHANGE UP (ref 11.5–15.5)
IMM GRANULOCYTES NFR BLD AUTO: 0.4 % — SIGNIFICANT CHANGE UP (ref 0–0.9)
INR BLD: 0.91 RATIO — SIGNIFICANT CHANGE UP (ref 0.85–1.16)
KETONES UR-MCNC: NEGATIVE MG/DL — SIGNIFICANT CHANGE UP
LEUKOCYTE ESTERASE UR-ACNC: NEGATIVE — SIGNIFICANT CHANGE UP
LYMPHOCYTES # BLD AUTO: 28.9 % — SIGNIFICANT CHANGE UP (ref 13–44)
LYMPHOCYTES # BLD AUTO: 3.28 K/UL — SIGNIFICANT CHANGE UP (ref 1–3.3)
MCHC RBC-ENTMCNC: 30.3 PG — SIGNIFICANT CHANGE UP (ref 27–34)
MCHC RBC-ENTMCNC: 31.3 GM/DL — LOW (ref 32–36)
MCV RBC AUTO: 97 FL — SIGNIFICANT CHANGE UP (ref 80–100)
MONOCYTES # BLD AUTO: 0.78 K/UL — SIGNIFICANT CHANGE UP (ref 0–0.9)
MONOCYTES NFR BLD AUTO: 6.9 % — SIGNIFICANT CHANGE UP (ref 2–14)
NEUTROPHILS # BLD AUTO: 7.04 K/UL — SIGNIFICANT CHANGE UP (ref 1.8–7.4)
NEUTROPHILS NFR BLD AUTO: 62.2 % — SIGNIFICANT CHANGE UP (ref 43–77)
NITRITE UR-MCNC: NEGATIVE — SIGNIFICANT CHANGE UP
NRBC # BLD: 0 /100 WBCS — SIGNIFICANT CHANGE UP (ref 0–0)
PH UR: 8 — SIGNIFICANT CHANGE UP (ref 5–8)
PLATELET # BLD AUTO: 325 K/UL — SIGNIFICANT CHANGE UP (ref 150–400)
POTASSIUM SERPL-MCNC: 4.9 MMOL/L — SIGNIFICANT CHANGE UP (ref 3.5–5.3)
POTASSIUM SERPL-MCNC: 5.3 MMOL/L — SIGNIFICANT CHANGE UP (ref 3.5–5.3)
POTASSIUM SERPL-MCNC: 5.7 MMOL/L — HIGH (ref 3.5–5.3)
POTASSIUM SERPL-MCNC: 6.2 MMOL/L — CRITICAL HIGH (ref 3.5–5.3)
POTASSIUM SERPL-SCNC: 4.9 MMOL/L — SIGNIFICANT CHANGE UP (ref 3.5–5.3)
POTASSIUM SERPL-SCNC: 5.3 MMOL/L — SIGNIFICANT CHANGE UP (ref 3.5–5.3)
POTASSIUM SERPL-SCNC: 5.7 MMOL/L — HIGH (ref 3.5–5.3)
POTASSIUM SERPL-SCNC: 6.2 MMOL/L — CRITICAL HIGH (ref 3.5–5.3)
PROT SERPL-MCNC: 7.3 G/DL — SIGNIFICANT CHANGE UP (ref 6–8.3)
PROT SERPL-MCNC: 7.8 G/DL — SIGNIFICANT CHANGE UP (ref 6–8.3)
PROT UR-MCNC: NEGATIVE MG/DL — SIGNIFICANT CHANGE UP
PROTHROM AB SERPL-ACNC: 10.5 SEC — SIGNIFICANT CHANGE UP (ref 9.9–13.4)
RBC # BLD: 4.35 M/UL — SIGNIFICANT CHANGE UP (ref 3.8–5.2)
RBC # FLD: 14.9 % — HIGH (ref 10.3–14.5)
RBC CASTS # UR COMP ASSIST: 0 /HPF — SIGNIFICANT CHANGE UP (ref 0–4)
SODIUM SERPL-SCNC: 133 MMOL/L — LOW (ref 135–145)
SODIUM SERPL-SCNC: 133 MMOL/L — LOW (ref 135–145)
SODIUM SERPL-SCNC: 136 MMOL/L — SIGNIFICANT CHANGE UP (ref 135–145)
SODIUM SERPL-SCNC: 138 MMOL/L — SIGNIFICANT CHANGE UP (ref 135–145)
SP GR SPEC: 1.02 — SIGNIFICANT CHANGE UP (ref 1–1.03)
SQUAMOUS # UR AUTO: 0 /HPF — SIGNIFICANT CHANGE UP (ref 0–5)
TROPONIN T, HIGH SENSITIVITY RESULT: 16 NG/L — SIGNIFICANT CHANGE UP (ref 0–51)
UROBILINOGEN FLD QL: 0.2 MG/DL — SIGNIFICANT CHANGE UP (ref 0.2–1)
WBC # BLD: 11.34 K/UL — HIGH (ref 3.8–10.5)
WBC # FLD AUTO: 11.34 K/UL — HIGH (ref 3.8–10.5)
WBC UR QL: 0 /HPF — SIGNIFICANT CHANGE UP (ref 0–5)

## 2024-10-07 PROCEDURE — 82435 ASSAY OF BLOOD CHLORIDE: CPT

## 2024-10-07 PROCEDURE — 70498 CT ANGIOGRAPHY NECK: CPT | Mod: MC

## 2024-10-07 PROCEDURE — 99285 EMERGENCY DEPT VISIT HI MDM: CPT

## 2024-10-07 PROCEDURE — 82947 ASSAY GLUCOSE BLOOD QUANT: CPT

## 2024-10-07 PROCEDURE — 80048 BASIC METABOLIC PNL TOTAL CA: CPT

## 2024-10-07 PROCEDURE — 70498 CT ANGIOGRAPHY NECK: CPT | Mod: 26,MC

## 2024-10-07 PROCEDURE — 82330 ASSAY OF CALCIUM: CPT

## 2024-10-07 PROCEDURE — 84484 ASSAY OF TROPONIN QUANT: CPT

## 2024-10-07 PROCEDURE — 70496 CT ANGIOGRAPHY HEAD: CPT | Mod: 26,MC

## 2024-10-07 PROCEDURE — 82803 BLOOD GASES ANY COMBINATION: CPT

## 2024-10-07 PROCEDURE — 0042T: CPT | Mod: MC

## 2024-10-07 PROCEDURE — 99285 EMERGENCY DEPT VISIT HI MDM: CPT | Mod: 25

## 2024-10-07 PROCEDURE — 85014 HEMATOCRIT: CPT

## 2024-10-07 PROCEDURE — 99284 EMERGENCY DEPT VISIT MOD MDM: CPT | Mod: GC

## 2024-10-07 PROCEDURE — 85018 HEMOGLOBIN: CPT

## 2024-10-07 PROCEDURE — 36415 COLL VENOUS BLD VENIPUNCTURE: CPT

## 2024-10-07 PROCEDURE — 80053 COMPREHEN METABOLIC PANEL: CPT

## 2024-10-07 PROCEDURE — 93005 ELECTROCARDIOGRAM TRACING: CPT

## 2024-10-07 PROCEDURE — 85025 COMPLETE CBC W/AUTO DIFF WBC: CPT

## 2024-10-07 PROCEDURE — 84295 ASSAY OF SERUM SODIUM: CPT

## 2024-10-07 PROCEDURE — 71045 X-RAY EXAM CHEST 1 VIEW: CPT

## 2024-10-07 PROCEDURE — 81001 URINALYSIS AUTO W/SCOPE: CPT

## 2024-10-07 PROCEDURE — 70450 CT HEAD/BRAIN W/O DYE: CPT | Mod: MC

## 2024-10-07 PROCEDURE — 96360 HYDRATION IV INFUSION INIT: CPT | Mod: XU

## 2024-10-07 PROCEDURE — 82962 GLUCOSE BLOOD TEST: CPT

## 2024-10-07 PROCEDURE — 70496 CT ANGIOGRAPHY HEAD: CPT | Mod: MC

## 2024-10-07 PROCEDURE — 71045 X-RAY EXAM CHEST 1 VIEW: CPT | Mod: 26

## 2024-10-07 PROCEDURE — 85730 THROMBOPLASTIN TIME PARTIAL: CPT

## 2024-10-07 PROCEDURE — 70450 CT HEAD/BRAIN W/O DYE: CPT | Mod: 26,MC,XU

## 2024-10-07 PROCEDURE — 83605 ASSAY OF LACTIC ACID: CPT

## 2024-10-07 PROCEDURE — 85610 PROTHROMBIN TIME: CPT

## 2024-10-07 PROCEDURE — 84132 ASSAY OF SERUM POTASSIUM: CPT

## 2024-10-07 RX ORDER — MECLIZINE HYDROCLORIDE 25 MG/1
12.5 TABLET ORAL ONCE
Refills: 0 | Status: COMPLETED | OUTPATIENT
Start: 2024-10-07 | End: 2024-10-07

## 2024-10-07 RX ORDER — SODIUM CHLORIDE 0.9 % (FLUSH) 0.9 %
500 SYRINGE (ML) INJECTION ONCE
Refills: 0 | Status: COMPLETED | OUTPATIENT
Start: 2024-10-07 | End: 2024-10-07

## 2024-10-07 RX ORDER — ACETAMINOPHEN 325 MG
650 TABLET ORAL ONCE
Refills: 0 | Status: COMPLETED | OUTPATIENT
Start: 2024-10-07 | End: 2024-10-07

## 2024-10-07 RX ORDER — MECLIZINE HYDROCLORIDE 25 MG/1
25 TABLET ORAL ONCE
Refills: 0 | Status: DISCONTINUED | OUTPATIENT
Start: 2024-10-07 | End: 2024-10-07

## 2024-10-07 RX ORDER — MECLIZINE HYDROCLORIDE 25 MG/1
1 TABLET ORAL
Qty: 9 | Refills: 0
Start: 2024-10-07 | End: 2024-10-09

## 2024-10-07 RX ADMIN — Medication 500 MILLILITER(S): at 14:22

## 2024-10-07 RX ADMIN — MECLIZINE HYDROCLORIDE 12.5 MILLIGRAM(S): 25 TABLET ORAL at 15:26

## 2024-10-07 RX ADMIN — Medication 500 MILLILITER(S): at 13:09

## 2024-10-07 RX ADMIN — Medication 650 MILLIGRAM(S): at 17:56

## 2024-10-07 RX ADMIN — Medication 500 MILLILITER(S): at 14:19

## 2024-10-07 NOTE — ED PROVIDER NOTE - CLINICAL SUMMARY MEDICAL DECISION MAKING FREE TEXT BOX
RGUJRAL 83yo female hx listed BIB son for evaluation of dizziness today. Pt states she went to sleep yesterday around 1130pm and felt fine, woke up this morning around 7am and noted dizziness with b/l eye blurry vision. Pt states a broom fell on her head the day prior but she is not sure if its related. No loc. No fall. Denies any chest pain, sob, abd pain, weakness. Pt states she still feels a bit "off". On exam, Patient is awake, alert and oriented x 3.  Patient is well appearing and in no acute distress.  NCAT, PERRL, EOMI.  Neck is supple  Lungs are CTA B/L,+S1S2   Abdomen:Soft nd/nt+bs no rebound or guarding.  Extremity no edema or calf tender.  Skin with no rash.  Neuro CN3-12 intact. Strength 5/5 in upper and lower extremities. Nml Sensation.Gait normal.   Code stroke called on arrival. Check labs, CT/Xray chest, EKG to eval for dizziness, ro CVA, ACS, electrolytes/infection. Monitor and re eval.

## 2024-10-07 NOTE — ED PROVIDER NOTE - INTERPRETATION
Detail Level: Detailed Depth Of Biopsy: dermis Was A Bandage Applied: Yes Size Of Lesion In Cm: 0.4 X Size Of Lesion In Cm: 0 Biopsy Type: H and E Biopsy Method: Dermablade Anesthesia Type: 1% lidocaine with epinephrine Anesthesia Volume In Cc (Will Not Render If 0): 0.5 Hemostasis: Drysol Wound Care: Petrolatum Dressing: bandage Destruction After The Procedure: No Type Of Destruction Used: Curettage Curettage Text: The wound bed was treated with curettage after the biopsy was performed. Cryotherapy Text: The wound bed was treated with cryotherapy after the biopsy was performed. Electrodesiccation Text: The wound bed was treated with electrodesiccation after the biopsy was performed. Electrodesiccation And Curettage Text: The wound bed was treated with electrodesiccation and curettage after the biopsy was performed. Silver Nitrate Text: The wound bed was treated with silver nitrate after the biopsy was performed. Lab: 6 Lab Facility: 3 Consent: Written consent was obtained and risks were reviewed including but not limited to scarring, infection, bleeding, scabbing, incomplete removal, nerve damage and allergy to anesthesia. Post-Care Instructions: I reviewed with the patient in detail post-care instructions. Patient is to keep the biopsy site dry overnight, and then apply bacitracin twice daily until healed. Patient may apply hydrogen peroxide soaks to remove any crusting. Notification Instructions: Patient will be notified of biopsy results. However, patient instructed to call the office if not contacted within 2 weeks. Billing Type: Third-Party Bill Information: Selecting Yes will display possible errors in your note based on the variables you have selected. This validation is only offered as a suggestion for you. PLEASE NOTE THAT THE VALIDATION TEXT WILL BE REMOVED WHEN YOU FINALIZE YOUR NOTE. IF YOU WANT TO FAX A PRELIMINARY NOTE YOU WILL NEED TO TOGGLE THIS TO 'NO' IF YOU DO NOT WANT IT IN YOUR FAXED NOTE. normal

## 2024-10-07 NOTE — ED ADULT TRIAGE NOTE - TEMP AT ED ARRIVAL (C)
fluticasone (FLONASE) 50 MCG/ACT nasal spray 2 sprays by Nasal route daily 1 each 11     No current facility-administered medications for this visit.       Physical Exam:  Also present during visit: spouse.    Mental Status   Orientation: oriented to person, oriented to place, oriented to problem, and oriented to time    Mood/affectappropriate mood and appropriate affect, pleasant   Memory/Other: recent memory intact, remote memory intact, fund of knowledge intact, attention span normal, and concentration normal  Language  Language: (normal) language, no dysarthria, (normal) articulation, and no dysphasia/aphasia  Cranial Nerves   Eyes: pupils normal size and reactive to light and visual fields appear full   CN III, IV, VI : extraocular muscle strength normal, normal pursuit, no nystagmus, and no ptosis   Facial Motor: normal facial motor   CN XII: tongue protrudes midline  Motor/Coordination Exam              General: no bradykinesia, no tremors, no chorea, no athetosis, no myoclonus, and no dyskinesia              Right Upper Extremity: normal motor strength, normal bulk, and normal tone              Left Upper Extremity: 4/5 left hip flexor, 4/5 knee extension, 5/5 knee flexion, 5/5 dorsiflexion of the foot normal bulk, and normal tone              Right Lower Extremity: normal motor strength, normal bulk, and normal tone              Left Lower Extremity: normal motor strength, normal bulk, and normal tone              Coordination: no drift, normal finger-to-nose, and rapid alternating movements normal  Sensory: Length-dependent diminished sensation to temperature and vibration bilateral lower extremities  Gait and Stance   Gait/Posture: Mildly antalgic gait        /72 (Site: Right Upper Arm)   Pulse 82   Wt (!) 165.1 kg (364 lb)   SpO2 92%   BMI 46.73 kg/m²     Assessment and Plan     Diagnosis Orders   1. Bilateral carpal tunnel syndrome  External Referral To Orthopedic Surgery      2. Chronic 
36.7

## 2024-10-07 NOTE — ED ADULT NURSE NOTE - OBJECTIVE STATEMENT
84y F PMH HTN, DM presents to the ED c/o dizziness, blurry vision around 7am today. Code stroke activated at 1220. Pt reports going to sleep around 1130pm last night and had no medical complaints. Pt woke up around 7am and felt dizzy with unsteady gait. Pt reports she had blurry vision/difficulty seeing. Pt reports feeling a bit off balance while in the ED. Pt also complaining of headache s/p small wooden broom falling onto her head. Pt takes 81mg asa daily. Pt denies chest pain, sob, nvd, abd pain, urinary symptoms, fevers, chills. Pt aox4. Comfort and safety maintained.

## 2024-10-07 NOTE — STROKE CODE NOTE - NSSTROKETPAEXCLABS_TIMEWINDOW
Patient is outside the appropriate time window for thrombolytic therapy
You can access the FollowMyHealth Patient Portal offered by [facility] by registering at the following website: [email or web address]. By joining [employer]’s FollowMyHealth portal, you will also be able to view your health information using other applications (apps) compatible with our system.

## 2024-10-07 NOTE — ED PROVIDER NOTE - CARE PROVIDER_API CALL
Remi Francois  Neurology  [address] [facility] [place]  [place] [address]  Phone: [phone]  Fax: [phone]  Follow Up Time: 4-6 Days

## 2024-10-07 NOTE — ED PROVIDER NOTE - PATIENT PORTAL LINK FT
You can access the FollowMyHealth Patient Portal offered by [facility] by registering at the following website: [email or web address]. By joining [employer]’s FollowMyHealth portal, you will also be able to view your health information using other applications (apps) compatible with our system.

## 2024-10-07 NOTE — ED PROVIDER NOTE - IV ALTEPLASE EXCL REL HIDDEN
show 1 Principal Discharge DX:	Rib fracture   Principal Discharge DX:	Multiple fractures of ribs of right side

## 2024-10-07 NOTE — ED PROVIDER NOTE - NSFOLLOWUPCLINICS_GEN_ALL_ED_FT
[facility]  Neurology Autoimmune Encephalitis  [address] [place] [id number]  Phone: [phone]  Fax: [phone]  Follow Up Time: 4-6 Days

## 2024-10-07 NOTE — ED PROVIDER NOTE - OBJECTIVE STATEMENT
84-year-old female with PMH HTN, DM, here for evaluation of dizziness and difficulty seeing starting at 7 AM this morning when she awoke.  Patient states she awoke this morning, when she started to experience dizziness and unsteady gait with associated blurred vision difficulty seeing.  She states symptoms slowly resolved over period of a few hours, went to urgent care who sent her here for evaluation.  States almost complete resolution in symptoms other than feeling a bit off balance.  On baby aspirin daily, denies any focal weakness or numbness, yesterday states she had a small endoscopy and fall on her head and has soreness to her scalp but denies any other injuries or falls.  Last known well was 11:30 PM last night.  Denies any fevers, chills, chest pain, shortness of breath, abdominal pain, nausea or vomiting.

## 2024-10-07 NOTE — ED ADULT NURSE NOTE - CCCP TRG CHIEF CMPLNT
The following information and instructions were given:    Do not eat, drink, smoke or chew gum after midnight the night before surgery. This includes no mints.  Take all routine, prescribed medications including heart and blood pressure medicines with a sip of water unless otherwise instructed by your physician.   Do NOT take diabetic medication unless instructed by your physician.      DO NOT shave for two days before your procedure.  Do not wear makeup.      DO NOT wear fingernail polish (gel/regular) and/or acrylic/artificial nails on the day of surgery. If you had a recent manicure and would rather not remove polish or artificial nails, the minimum requirement is that the polish/artificial nails must be removed from the middle finger on each hand.      If you are having surgery/procedure on an upper extremity, fingernail polish/artificial fingernails must be removed for surgery.  NO EXCEPTIONS.      If you are having surgery/procedure on a lower extremity, toenail polish on both extremities must be removed for surgery.  NO EXCEPTIONS.    Remove all jewelry (advise to go to jeweler if unable to remove).  Jewelry, especially rings, can no longer be taped for surgery.    Leave anything you consider valuable at home.    Leave your suitcase in the car until after your surgery if you are staying overnight.    Bring the following with you the day of your procedure (when applicable):       -Picture ID and insurance cards       -Co-pay/deductible required by insurance       -Medications in the original bottles or a detailed list (name, dosage, frequency of medications) including all over-the-counter medications if not brought to PAT       -Copy of advance directive, living will or power of  documents if not brought to PAT       -CPAP or BIPAP mask and tubing (do not bring machine)       -Skin prep instruction(s) sheet       -PAT Pass    Educational handout or binder (joint replacements) related to procedure given  to patient.  Educational handout also includes general information related to the recovery that mentions signs and symptoms of infection and when to call the doctor.    When applicable, an ERAS handout was given to patient.    Respirex use and pneumonia prevention education provided in Pre Admission Testing general education video.    Information related to infection and hand hygiene mentioned in Pre Admission Testing general education video. Patient instructed to call their doctor if any of the following symptoms are noted during recovery:  Fever of 100.4 F or higher, incision that is warm or has increasing bleeding, redness or drainage.    DVT Prevention instructions given in general education video presentation during Pre Admission Testing appointment that stress the importance of ambulation to improve blood circulation.  Also encouraged patient to perform foot exercises when in bed and application of a sequential device may be applied to lower extremities to improve circulation.      Please apply Chlorhexidine wipes to surgical area (if instructed) the night before procedure and the AM of procedure and document date/time of applications on skin prep instruction sheet.         head trauma

## 2024-10-07 NOTE — ED PROVIDER NOTE - SHIFT CHANGE DETAILS
Attending MD Puckett: 84F w HTN, dizziness, blurry vision OU, broom fell on her, seen by neuro attdg post concussive, repeat CMP and VBG at lab, repeat potassium wnl but hemolyzed so CMP ordered, repeat VBG, lactate downtrending but repeat ordered, if abnormal this third time, repeat, but can likely be discharged

## 2024-10-07 NOTE — ED PROVIDER NOTE - PROGRESS NOTE DETAILS
I received signout on patient from [facility] Murray pending final neurology recommendations and repeat labs. Neurology evaluated patient with attending and advised to discharge home with outpatient neurology follow-up for MRI as outpatient.  Repeat potassium and lactate are both within normal limits. - Gabriele Hirsch PA-C I received sign-out on patient from [facility] Murray pending final neurology recommendations and repeat labs. Neurology evaluated patient with attending and advised to discharge home with outpatient neurology follow-up for MRI as outpatient.  Repeat potassium and lactate are both within normal limits. patient and family at bedside aware of discharge and comfortable w/ plan. ED attending aware.  - Gabriele Hirsch PA-C Attending MD Puckett: Patient re-evaluated and feeling improved.  No acute issues at  this time.  Lab and radiology tests reviewed with patient and son.  Discussed why we needed multiple lab draws and how lactate and potassium have normalized.  Discussed outpatient neuro.  Patient stable for discharge. Eager for discharge. Follow up instructions given, importance of follow up emphasized, return to ED parameters reviewed and patient verbalized understanding.  All questions answered, all concerns addressed.

## 2024-10-07 NOTE — CONSULT NOTE ADULT - ASSESSMENT
Impression: Patient presents with transient dizziness and vision blurriness most likely due to presyncope, less likely stroke.     85 yo right handed female with PMH of HTN and HLD presents with transient dizziness and vision blurriness since waking up at 7:00 this morning, LKW 23:00 10/6. She went to urgent care for the symptoms and was told to come to the ED. Patient was struck on the head yesterday by the handle of a broom, and felt pain in the spot, but no other symptoms at the time, and she went to sleep at 11:30pm. She woke up with dizziness, which she describes as feeling unsteady and swaying, and blurriness in both eyes which resolved after around an hour. At this time reports symptoms have mostly resolved, and patient only complains of headache through her head, some unsteady gait, and general head fogginess which she describes as having a rock in her head. She has a history of lumbar disc herniation which baseline R leg weakness. Walks with a cane at home.     Impression: Patient presents with transient dizziness and vision blurriness possibly due to mild concussion, less likely stroke.    Recommendations:  [] Can give tylenol for pain control  [] Can followup with neurology outpatient at [address] [place] [address]    Note and plan not finalized until attending attestation and signature.  83 yo right handed female with PMH of HTN and HLD presents with transient dizziness and vision blurriness since waking up at 7:00 this morning, LKW 23:00 10/6. She went to urgent care for the symptoms and was told to come to the ED. Patient was struck on the head yesterday by the handle of a broom, and felt pain in the spot, but no other symptoms at the time, and she went to sleep at 11:30pm. She woke up with dizziness, which she describes as feeling unsteady and swaying, and blurriness in both eyes which resolved after around an hour. At this time reports symptoms have mostly resolved, and patient only complains of headache through her head, some unsteady gait, and general head fogginess which she describes as having a rock in her head. She has a history of lumbar disc herniation which baseline R leg weakness. Walks with a cane at home.     Impression: Patient presents with transient dizziness and vision blurriness possibly due to mild concussion, less likely stroke.    Recommendations:  [] Can give tylenol for pain control  [] Meclizine 12.5-25mg Q8 PRN  [] Can followup with neurology outpatient at [address] [place] [address]    Note and plan not finalized until attending attestation and signature.  85 yo right handed female with PMH of HTN and HLD presents with transient dizziness and vision blurriness since waking up at 7:00 this morning, LKW 23:00 10/6. She went to urgent care for the symptoms and was told to come to the ED. Patient was struck on the head yesterday by the handle of a broom, and felt pain in the spot, but no other symptoms at the time, and she went to sleep at 11:30pm. She woke up with dizziness, which she describes as feeling unsteady and swaying, and blurriness in both eyes which resolved after around an hour. At this time reports symptoms have mostly resolved, and patient only complains of headache through her head, some unsteady gait, and general head fogginess which she describes as having a rock in her head. She has a history of lumbar disc herniation which baseline R leg weakness. Walks with a cane at home.     Impression: Patient presents with transient dizziness and vision blurriness possibly due to mild concussion, less likely stroke.    Recommendations:  [] Can give tylenol for pain control  [] Meclizine 12.5-25mg Q8 PRN  [] Can followup with neurology outpatient at [address] [place] [address] or with Dr. Francois: [address] [facility] [place] [address]. Call [phone].   [] Should get outpatient MRI Brain if symptoms persist    Note and plan not finalized until attending attestation and signature.

## 2024-10-07 NOTE — ED PROVIDER NOTE - NSFOLLOWUPINSTRUCTIONS_ED_ALL_ED_FT
Follow up with neurologist Dr. Francois or at our [address] [place] [address] neurology clinic within the next 1 week.    Take Tylenol 650 mg every 6 hours as needed for pain.     Take Meclizine 12.5 mg every 8 hours as needed for dizziness.    Return to the Emergency Department immediately if you develop any new/worsening symptoms including increasing headache, vomiting, speech/visual changes, numbness/tingling, weakness or any other concerning symptoms. Follow up with neurologist Dr. Francois or at our [address] [place] [address] neurology clinic within the next 1 week.    Follow up with your primary care doctor in 2-3 days for repeat evaluation and to re-check your blood pressure.     Take Tylenol 650 mg every 6 hours as needed for pain.     Take Meclizine 12.5 mg every 8 hours as needed for dizziness.    Return to the Emergency Department immediately if you develop any new/worsening symptoms including increasing headache, vomiting, speech/visual changes, numbness/tingling, weakness or any other concerning symptoms.

## 2024-10-07 NOTE — ED PROVIDER NOTE - PHYSICAL EXAMINATION
A&Ox3, NAD, well appearing  NCAT. PERRL, EOMI. No nystagmus   Lungs CTAB. No w/r/r  Cardiac  RRR  Abd soft, NT/ND  Extremities: no edema.   Skin without rash.   No focal Deficits, Strength 5/5 UE/LE. Hesitant gait with assistance. No drift.

## 2024-10-07 NOTE — ED ADULT NURSE NOTE - NS ED NOTE ABUSE RESPONSE YN
From: Pritesh Rivera  To: Richard Thompson  Sent: 2024 7:29 PM CDT  Subject: Eye exam    Belkis  I got your voice mail but could not contact you by phone.   I do not want a covid booster at this time.   My eye doctor is Dr. Rodriguez at the [facility]. My last exam was . they said my eyes were healthy.  You can request my records by contacting Release of Information [phone] or by faxing [phone] I signed a form saying it is ok for your office to get my records. They never said they  but if you have any trouble I will submit a new form.    Thanks   Marija   Yes

## 2024-10-07 NOTE — CONSULT NOTE ADULT - ATTENDING COMMENTS
During the evaluation, the patient's son present, and I appreciated his involvement and support    During my evaluation, I interviewed the patient, discussed the case with the Resident, and I agree with the findings and plan as documented in the Resident’s note, with the exception of the following:          •     The patient is currently feeling much better with a non-focal exam.        •     The CT of the brain did not show any acute pathology.           Treatment Plan:             •       MRI brain         •     Naprosyn 500 mg three times a day (TID) after food if no contraindication.         •     Meclizine 12.5  mg as prescribed.        •     Continue medical management, with neuro checks and fall precautions.        •     GI and DVT prophylaxis.    I discussed the diagnosis and treatment plan with the patient and son, addressing all questions and concerns. The patient and son demonstrated a good understanding of the treatment plan.  My cumulative time spent on the care of this patient was 60 minutes.  If you have any further questions, please do not hesitate to contact our consult service.  Thank you for allowing us to participate in this patient’s care.

## 2025-01-13 ENCOUNTER — INPATIENT (INPATIENT)
Facility: HOSPITAL | Age: 85
LOS: 0 days | Discharge: HOME HEALTH | DRG: 552 | End: 2025-01-14
Attending: INTERNAL MEDICINE | Admitting: INTERNAL MEDICINE
Payer: MEDICARE

## 2025-01-13 VITALS
RESPIRATION RATE: 16 BRPM | SYSTOLIC BLOOD PRESSURE: 168 MMHG | HEART RATE: 78 BPM | TEMPERATURE: 98 F | WEIGHT: 149.91 LBS | DIASTOLIC BLOOD PRESSURE: 87 MMHG | HEIGHT: 61 IN | OXYGEN SATURATION: 99 %

## 2025-01-13 DIAGNOSIS — M54.9 DORSALGIA, UNSPECIFIED: ICD-10-CM

## 2025-01-13 DIAGNOSIS — Z90.49 ACQUIRED ABSENCE OF OTHER SPECIFIED PARTS OF DIGESTIVE TRACT: Chronic | ICD-10-CM

## 2025-01-13 LAB
ALBUMIN SERPL ELPH-MCNC: 3.9 G/DL — SIGNIFICANT CHANGE UP (ref 3.3–5)
ALP SERPL-CCNC: 83 U/L — SIGNIFICANT CHANGE UP (ref 40–120)
ALT FLD-CCNC: 13 U/L — SIGNIFICANT CHANGE UP (ref 10–45)
ANION GAP SERPL CALC-SCNC: 16 MMOL/L — SIGNIFICANT CHANGE UP (ref 5–17)
APPEARANCE UR: CLEAR — SIGNIFICANT CHANGE UP
AST SERPL-CCNC: 19 U/L — SIGNIFICANT CHANGE UP (ref 10–40)
BACTERIA # UR AUTO: NEGATIVE /HPF — SIGNIFICANT CHANGE UP
BASOPHILS # BLD AUTO: 0.03 K/UL — SIGNIFICANT CHANGE UP (ref 0–0.2)
BASOPHILS NFR BLD AUTO: 0.3 % — SIGNIFICANT CHANGE UP (ref 0–2)
BILIRUB SERPL-MCNC: 0.4 MG/DL — SIGNIFICANT CHANGE UP (ref 0.2–1.2)
BILIRUB UR-MCNC: NEGATIVE — SIGNIFICANT CHANGE UP
BUN SERPL-MCNC: 27 MG/DL — HIGH (ref 7–23)
CALCIUM SERPL-MCNC: 9.9 MG/DL — SIGNIFICANT CHANGE UP (ref 8.4–10.5)
CAST: 0 /LPF — SIGNIFICANT CHANGE UP (ref 0–4)
CHLORIDE SERPL-SCNC: 98 MMOL/L — SIGNIFICANT CHANGE UP (ref 96–108)
CO2 SERPL-SCNC: 23 MMOL/L — SIGNIFICANT CHANGE UP (ref 22–31)
COLOR SPEC: YELLOW — SIGNIFICANT CHANGE UP
CREAT SERPL-MCNC: 0.98 MG/DL — SIGNIFICANT CHANGE UP (ref 0.5–1.3)
DIFF PNL FLD: NEGATIVE — SIGNIFICANT CHANGE UP
EGFR: 57 ML/MIN/1.73M2 — LOW
EOSINOPHIL # BLD AUTO: 0.19 K/UL — SIGNIFICANT CHANGE UP (ref 0–0.5)
EOSINOPHIL NFR BLD AUTO: 2.1 % — SIGNIFICANT CHANGE UP (ref 0–6)
GLUCOSE SERPL-MCNC: 158 MG/DL — HIGH (ref 70–99)
GLUCOSE UR QL: >=1000 MG/DL
HCT VFR BLD CALC: 38.9 % — SIGNIFICANT CHANGE UP (ref 34.5–45)
HGB BLD-MCNC: 12 G/DL — SIGNIFICANT CHANGE UP (ref 11.5–15.5)
IMM GRANULOCYTES NFR BLD AUTO: 0.4 % — SIGNIFICANT CHANGE UP (ref 0–0.9)
KETONES UR-MCNC: NEGATIVE MG/DL — SIGNIFICANT CHANGE UP
LEUKOCYTE ESTERASE UR-ACNC: NEGATIVE — SIGNIFICANT CHANGE UP
LYMPHOCYTES # BLD AUTO: 2.37 K/UL — SIGNIFICANT CHANGE UP (ref 1–3.3)
LYMPHOCYTES # BLD AUTO: 26 % — SIGNIFICANT CHANGE UP (ref 13–44)
MCHC RBC-ENTMCNC: 29 PG — SIGNIFICANT CHANGE UP (ref 27–34)
MCHC RBC-ENTMCNC: 30.8 G/DL — LOW (ref 32–36)
MCV RBC AUTO: 94 FL — SIGNIFICANT CHANGE UP (ref 80–100)
MONOCYTES # BLD AUTO: 0.61 K/UL — SIGNIFICANT CHANGE UP (ref 0–0.9)
MONOCYTES NFR BLD AUTO: 6.7 % — SIGNIFICANT CHANGE UP (ref 2–14)
NEUTROPHILS # BLD AUTO: 5.89 K/UL — SIGNIFICANT CHANGE UP (ref 1.8–7.4)
NEUTROPHILS NFR BLD AUTO: 64.5 % — SIGNIFICANT CHANGE UP (ref 43–77)
NITRITE UR-MCNC: NEGATIVE — SIGNIFICANT CHANGE UP
NRBC # BLD: 0 /100 WBCS — SIGNIFICANT CHANGE UP (ref 0–0)
PH UR: 6.5 — SIGNIFICANT CHANGE UP (ref 5–8)
PLATELET # BLD AUTO: 291 K/UL — SIGNIFICANT CHANGE UP (ref 150–400)
POTASSIUM SERPL-MCNC: 4.9 MMOL/L — SIGNIFICANT CHANGE UP (ref 3.5–5.3)
POTASSIUM SERPL-SCNC: 4.9 MMOL/L — SIGNIFICANT CHANGE UP (ref 3.5–5.3)
PROT SERPL-MCNC: 7.2 G/DL — SIGNIFICANT CHANGE UP (ref 6–8.3)
PROT UR-MCNC: NEGATIVE MG/DL — SIGNIFICANT CHANGE UP
RBC # BLD: 4.14 M/UL — SIGNIFICANT CHANGE UP (ref 3.8–5.2)
RBC # FLD: 15.2 % — HIGH (ref 10.3–14.5)
RBC CASTS # UR COMP ASSIST: 1 /HPF — SIGNIFICANT CHANGE UP (ref 0–4)
SODIUM SERPL-SCNC: 137 MMOL/L — SIGNIFICANT CHANGE UP (ref 135–145)
SP GR SPEC: 1.01 — SIGNIFICANT CHANGE UP (ref 1–1.03)
SQUAMOUS # UR AUTO: 0 /HPF — SIGNIFICANT CHANGE UP (ref 0–5)
UROBILINOGEN FLD QL: 0.2 MG/DL — SIGNIFICANT CHANGE UP (ref 0.2–1)
WBC # BLD: 9.13 K/UL — SIGNIFICANT CHANGE UP (ref 3.8–10.5)
WBC # FLD AUTO: 9.13 K/UL — SIGNIFICANT CHANGE UP (ref 3.8–10.5)
WBC UR QL: 1 /HPF — SIGNIFICANT CHANGE UP (ref 0–5)

## 2025-01-13 PROCEDURE — 99285 EMERGENCY DEPT VISIT HI MDM: CPT

## 2025-01-13 PROCEDURE — 74176 CT ABD & PELVIS W/O CONTRAST: CPT | Mod: 26

## 2025-01-13 PROCEDURE — 99222 1ST HOSP IP/OBS MODERATE 55: CPT

## 2025-01-13 PROCEDURE — 72131 CT LUMBAR SPINE W/O DYE: CPT | Mod: 26

## 2025-01-13 RX ORDER — LIDOCAINE 50 MG/G
1 OINTMENT TOPICAL DAILY
Refills: 0 | Status: DISCONTINUED | OUTPATIENT
Start: 2025-01-13 | End: 2025-01-14

## 2025-01-13 RX ORDER — ATORVASTATIN CALCIUM 40 MG/1
20 TABLET, FILM COATED ORAL AT BEDTIME
Refills: 0 | Status: DISCONTINUED | OUTPATIENT
Start: 2025-01-13 | End: 2025-01-14

## 2025-01-13 RX ORDER — MAG HYDROX/ALUMINUM HYD/SIMETH 200-200-20
30 SUSPENSION, ORAL (FINAL DOSE FORM) ORAL ONCE
Refills: 0 | Status: COMPLETED | OUTPATIENT
Start: 2025-01-13 | End: 2025-01-13

## 2025-01-13 RX ORDER — TRAMADOL HYDROCHLORIDE 50 MG/1
25 TABLET ORAL ONCE
Refills: 0 | Status: DISCONTINUED | OUTPATIENT
Start: 2025-01-13 | End: 2025-01-13

## 2025-01-13 RX ORDER — CYCLOBENZAPRINE HCL 10 MG
5 TABLET ORAL
Refills: 0 | Status: DISCONTINUED | OUTPATIENT
Start: 2025-01-13 | End: 2025-01-13

## 2025-01-13 RX ORDER — OXYCODONE AND ACETAMINOPHEN 5; 325 MG/1; MG/1
1 TABLET ORAL EVERY 8 HOURS
Refills: 0 | Status: DISCONTINUED | OUTPATIENT
Start: 2025-01-13 | End: 2025-01-13

## 2025-01-13 RX ORDER — ACETAMINOPHEN 80 MG/.8ML
650 SOLUTION/ DROPS ORAL EVERY 8 HOURS
Refills: 0 | Status: DISCONTINUED | OUTPATIENT
Start: 2025-01-13 | End: 2025-01-14

## 2025-01-13 RX ORDER — GABAPENTIN 300 MG/1
100 CAPSULE ORAL DAILY
Refills: 0 | Status: DISCONTINUED | OUTPATIENT
Start: 2025-01-13 | End: 2025-01-13

## 2025-01-13 RX ORDER — GABAPENTIN 300 MG/1
300 CAPSULE ORAL
Refills: 0 | Status: DISCONTINUED | OUTPATIENT
Start: 2025-01-13 | End: 2025-01-14

## 2025-01-13 RX ORDER — LISINOPRIL 30 MG/1
2.5 TABLET ORAL DAILY
Refills: 0 | Status: DISCONTINUED | OUTPATIENT
Start: 2025-01-13 | End: 2025-01-14

## 2025-01-13 RX ORDER — ASPIRIN 81 MG
81 TABLET, DELAYED RELEASE (ENTERIC COATED) ORAL DAILY
Refills: 0 | Status: DISCONTINUED | OUTPATIENT
Start: 2025-01-13 | End: 2025-01-14

## 2025-01-13 RX ORDER — SENNOSIDES 8.6 MG/1
2 TABLET, FILM COATED ORAL AT BEDTIME
Refills: 0 | Status: DISCONTINUED | OUTPATIENT
Start: 2025-01-13 | End: 2025-01-14

## 2025-01-13 RX ORDER — SODIUM CHLORIDE 9 MG/ML
500 INJECTION, SOLUTION INTRAMUSCULAR; INTRAVENOUS; SUBCUTANEOUS ONCE
Refills: 0 | Status: COMPLETED | OUTPATIENT
Start: 2025-01-13 | End: 2025-01-13

## 2025-01-13 RX ORDER — HEPARIN SODIUM 1000 [USP'U]/ML
5000 INJECTION, SOLUTION INTRAVENOUS; SUBCUTANEOUS EVERY 12 HOURS
Refills: 0 | Status: DISCONTINUED | OUTPATIENT
Start: 2025-01-13 | End: 2025-01-14

## 2025-01-13 RX ORDER — ACETAMINOPHEN 80 MG/.8ML
1000 SOLUTION/ DROPS ORAL ONCE
Refills: 0 | Status: COMPLETED | OUTPATIENT
Start: 2025-01-13 | End: 2025-01-13

## 2025-01-13 RX ORDER — FAMOTIDINE 20 MG/1
20 TABLET, FILM COATED ORAL ONCE
Refills: 0 | Status: COMPLETED | OUTPATIENT
Start: 2025-01-13 | End: 2025-01-13

## 2025-01-13 RX ORDER — CELECOXIB 200 MG
100 CAPSULE ORAL DAILY
Refills: 0 | Status: DISCONTINUED | OUTPATIENT
Start: 2025-01-13 | End: 2025-01-14

## 2025-01-13 RX ORDER — TIMOLOL MALEATE 0.5 %
1 DROPS OPHTHALMIC (EYE)
Refills: 0 | Status: DISCONTINUED | OUTPATIENT
Start: 2025-01-13 | End: 2025-01-14

## 2025-01-13 RX ADMIN — SENNOSIDES 2 TABLET(S): 8.6 TABLET, FILM COATED ORAL at 22:23

## 2025-01-13 RX ADMIN — Medication 1 DROP(S): at 18:55

## 2025-01-13 RX ADMIN — ACETAMINOPHEN 400 MILLIGRAM(S): 80 SOLUTION/ DROPS ORAL at 14:25

## 2025-01-13 RX ADMIN — ACETAMINOPHEN 1000 MILLIGRAM(S): 80 SOLUTION/ DROPS ORAL at 15:30

## 2025-01-13 RX ADMIN — FAMOTIDINE 20 MILLIGRAM(S): 20 TABLET, FILM COATED ORAL at 22:22

## 2025-01-13 RX ADMIN — TRAMADOL HYDROCHLORIDE 25 MILLIGRAM(S): 50 TABLET ORAL at 11:38

## 2025-01-13 RX ADMIN — SODIUM CHLORIDE 500 MILLILITER(S): 9 INJECTION, SOLUTION INTRAMUSCULAR; INTRAVENOUS; SUBCUTANEOUS at 14:25

## 2025-01-13 RX ADMIN — HEPARIN SODIUM 5000 UNIT(S): 1000 INJECTION, SOLUTION INTRAVENOUS; SUBCUTANEOUS at 18:55

## 2025-01-13 RX ADMIN — Medication 100 MILLIGRAM(S): at 18:54

## 2025-01-13 RX ADMIN — LIDOCAINE 1 PATCH: 50 OINTMENT TOPICAL at 23:50

## 2025-01-13 RX ADMIN — ACETAMINOPHEN 650 MILLIGRAM(S): 80 SOLUTION/ DROPS ORAL at 22:23

## 2025-01-13 RX ADMIN — Medication 30 MILLILITER(S): at 20:10

## 2025-01-13 RX ADMIN — ATORVASTATIN CALCIUM 20 MILLIGRAM(S): 40 TABLET, FILM COATED ORAL at 22:22

## 2025-01-13 NOTE — ED ADULT NURSE REASSESSMENT NOTE - NS ED NURSE REASSESS COMMENT FT1
Report received from  Hattie LOPEZ. Pt AXOX4 breathing unlabored on room air and speaking in complete sentences. Pt updated on plan of care; awaiting bed/UA/UC. Pt able to ambulate with 1 assist to the bathroom. Pt endorses 4/10 back pain; ofirmev infusing. Safety and comfort measures maintained. Bed in lowest position.

## 2025-01-13 NOTE — H&P ADULT - ASSESSMENT
85yo female        hx DM, HTN, HLD, chronic back pain  has  seen by spine and pain management   and  lives with his son      presents with lower back pain x few days worse over the last day    son  unable to  take  care  of pt and  askinf  for  placement       she has chronic back pain, is taking gabapentin and meloxicam wo relief     . denies any trauma, numbness, tingling or weakness, change in bladder or bowel. States pain has been chronic now increased.       uses her walker or cane. denies any chest/abd pain, f/c. Pt took tylenol at 9am.      awake,alert,oriented x 3. Patient is well appearing and in no acute distress.          c/c  back pain. on  pain meds          bit  wosre  at times/  CT spine,  c/c  comp L 1 fx. oA  soine         spine  eval    PT  eval   HTN/ HLD    DM    follow  fs     dvt ppx. PT eval      pt  is  full c ode       ad< from: CT Lumbar Spine Reform No Cont (01.13.25 @ 12:41) >  IMPRESSION:  Chronic compression fracture of L1 with loss of 25% of   vertebral body height andminimal bony retropulsion.   Grade 1 anterior   spondylolisthesis at L5-S1 on a degenerative basis.   Mild to moderate   degenerative disc disease and spondylosis at T12-L1 through L5-S1 with   loss of disc height and associated degenerative endplatechanges. Bulges   are noted at L1-2 through L5-S1 which flatten the ventral thecal sac and   narrow the BILATERAL neural foramina. Mild central stenosis at L1-2 and   L2-3 and moderate central stenosis at L3-4, L4-5 and L5-S1 on a   degenerative basis.  --- End of Report ---              <

## 2025-01-13 NOTE — ED PROVIDER NOTE - NSFOLLOWUPINSTRUCTIONS_ED_ALL_ED_FT
You have been seen and evaluated in the Emergency Department for your back pain. You were evaluated clinically and with imaging. The xrays were negative for fracture.     You can take Motrin or Tylenol as needed for pain.     Please follow up with your Primary Care Provider in 1-2 days regarding today's visit.     Please return to the Emergency Department for worsening pain, numbness/tingling in the legs, difficulty walking, loss of bladder control, or any concerns.

## 2025-01-13 NOTE — ED PROVIDER NOTE - CLINICAL SUMMARY MEDICAL DECISION MAKING FREE TEXT BOX
RGUJRAL 85yo female hx DM, HTN, HLD, chronic back pain seen by spine and pain management lives with his son presents with lower back pain x few days worse over the last day. States she has chronic back pain, is taking gabapentin and meloxicam wo relief. Denies any trauma, numbness, tingling or weakness, change in bladder or bowel. States pain has been chronic now increased. Pt lives with her son, when needed uses her walker or cane. Denies any chest/abd pain, f/c. Pt took tylenol at 9am.   On exam, Patient is awake,alert,oriented x 3. Patient is well appearing and in no acute distress. Patient's chest is clear to ausculation, +s1s2. Abdomen is soft nd/nt +BS. Extremity with no swelling or calf tenderness. Back: no posterior midline T/L spine tenderness. pelvis stable, 5/5 b/l LE strength, no saddle paresthesia. 2+ DP, nml sensation. + Left leg straight leg test.   Check screening labs, CT A/P, L/S to ro occult fracture, low suspicion for kidney stone. Pain control and monitor.

## 2025-01-13 NOTE — CONSULT NOTE ADULT - ASSESSMENT
84F hx DM, HTN, HLD, chronic back pain     Presents with lower back pain x few days, worse over the last day    Current out- patient pain regimen: Tylenol, Gabapentin 300 mg TID (Rx from endocrinologist for neuropathy), Pt reports taking BID  Out Patient Pain Management provider: Dr. Aj Rai ([facility])    Pain Score: 10/10    Pt w/ c/o severe, stabbing, low back pain, L>R, non-radiating.   Pt w/ chronic back pain, has had trigger point injections, spine injections, PT, nerve ablation which resolved neuropathic pain LLE). Patient reports she lives with chronic pain, and it was tolerable until recently. Dr. Rai started her on Meloxicam, pt reports good effect, but it upset her stomach (diarrhea) so she stopped taking it. Per son Dr. Rai stated there is nothing else he can offer. On physical exam pain was reproduced on spinal palpation, ?MSK.    CT L-spine w/ Chronic compression fracture L1, many degenerative changes, Bulges at L1-2 through L5-S1 which flatten the ventral thecal sac and narrow the BILATERAL neural foramina. Moderate central stenosis at L3-4, L4-5 and L5-S1 on a degenerative basis. See full report for details    Plan discussed with primary team:  Start short course of Celebrex 100 mg QD x 1 week (CrCl = 45.2 monitor)  Tylenol around the clock  Lidocaine patch  Restart Gabapentin home dose 300 mg BID  Discontinue Flexeril - On [place] criteria  Discontinue Percocet - Non-opioid regimen has not been trialed yet    Warm/cool packs for comfort  Incentive Spirometer  PT per primary team  Monitor for sedation, respiratory depression  Follow up with Dr. Rai for continued pain management after discharge  Out-patient pain practice list can be provided for a different pain management specialist after discharge if patient wishes  Narcan Rescue Kit on discharge (Naloxone 4 mg/0.1 ml nasal spray - 1 spray q 2-3 minutes alternating between nostrils)    Time spent on encounter:   60     Minutes    Chronic Pain Service  [phone]
- No acute neurosurgical intervention  - PT/ pain management per primary  - F/u outpatient with Dr. Thakur

## 2025-01-13 NOTE — H&P ADULT - NSHPPHYSICALEXAM_GEN_ALL_CORE
T(C): 36.9 (01-13-25 @ 11:11), Max: 36.9 (01-13-25 @ 11:11)  HR: 78 (01-13-25 @ 11:11) (78 - 78)  BP: 164/79 (01-13-25 @ 11:11) (164/79 - 168/87)  RR: 16 (01-13-25 @ 11:11) (16 - 16)  SpO2: 98% (01-13-25 @ 11:11) (98% - 99%)  GENERAL: NAD, lying in bed comfortably  HEAD:  Atraumatic, normocephalic  EYES: EOMI, PERRLA, conjunctiva and sclera clear  NECK: Supple, trachea midline, no JVD  HEART: Regular rate and rhythm, no murmurs, rubs, or gallops  LUNGS: Unlabored respirations.  Clear to auscultation bilaterally, no crackles, wheezing, or rhonchi  ABDOMEN: Soft, nontender, nondistended, +BS  EXTREMITIES: 2+ peripheral pulses bilaterally. No clubbing, cyanosis, or edema  NERVOUS SYSTEM:  A&Ox3, moving all extremities, no focal deficits   SKIN: No rashes or lesions/  ha s no  spianl  tenderness/ able  to raise  b/l legs

## 2025-01-13 NOTE — CHART NOTE - NSCHARTNOTEFT_GEN_A_CORE
Patient with acutely worsening chronic low back pain. Follows with pain management outpatient, meloxicam no longer providing relief. Chronic pain ACP informed of consult.  Orthopedics deferred consult given pt has never underwent surgery; neurosurgery covering spine today. Awaiting return page. Patient with acutely worsening chronic low back pain. Follows with pain management outpatient, meloxicam no longer providing relief. Chronic pain ACP informed of consult.  Orthopedics deferred consult given pt has never underwent surgery; neurosurgery covering spine today. Awaiting return page.    ADDENDUM: NSGY team to evaluate patient today.

## 2025-01-13 NOTE — ED PROVIDER NOTE - PATIENT PORTAL LINK FT
You can access the FollowMyHealth Patient Portal offered by [facility] by registering at the following website: [email or web address]. By joining [employer]’s FollowMyHealth portal, you will also be able to view your health information using other applications (apps) compatible with our system.

## 2025-01-13 NOTE — CONSULT NOTE ADULT - SUBJECTIVE AND OBJECTIVE BOX
Chief Complaint:  Patient is a 84y old  Female who presents with a chief complaint of back   pain (13 Jan 2025 17:45)    HPI:  84F hx DM, HTN, HLD, chronic back pain     Presents with lower back pain x few days, worse over the last day    Current out- patient pain regimen: Tylenol, Gabapentin 300 mg TID (Rx from endocrinologist for neuropathy), Pt reports taking BID    Out Patient Pain Management provider: Dr. Aj Rai ([facility])    [facility] Prescription Monitoring Program: Reference #[id number]    Opioid Risk Tool (ORT-OUD) Score: Low    Pain Score: 10/10    Pt seen in ED, lying on stretcher, reports she is ok "if I stay in this position". Pt w/ c/o severe, stabbing, low back pain, L>R, non-radiating.   Pt w/ chronic back pain, has had trigger point injections, spine injections, PT, nerve ablation which resolved neuropathic pain LLE). Patient reports she lives with chronic pain, and it was tolerable until recently. Dr. Rai started her on Meloxicam, pt reports good effect, but it upset her stomach (diarrhea) so she stopped taking it. Per son Dr. Rai stated there is nothing else he can offer. On physical exam pain was reproduced on spinal palpation, ?MSK.    CT L-spine w/ Chronic compression fracture L1, many degenerative changes, Bulges at L1-2 through L5-S1 which flatten the ventral thecal sac and narrow the BILATERAL neural foramina. Moderate central stenosis at L3-4, L4-5 and L5-S1 on a degenerative basis. See full report for details    REVIEW OF SYSTEMS:  CONSTITUTIONAL: No fever, weight loss, fatigue, falls  NEURO: No headaches, memory loss, loss of strength, tremors, dizziness or blurred vision  GI: No abdominal pain, nausea, vomiting, diarrhea, constipation, incontinence  : No urinary incontinence/retention  MSK: No joint pain or swelling; No new upper or lower motor strength weakness   SKIN: No itching, burning, rashes, or lesions   PSYCHIATRIC: No depression, anxiety, mood swings, or difficulty sleeping      PHYSICAL EXAM  GENERAL: Seen at bedside, well-groomed, well-developed, appears stated age, no signs of toxicity  NEURO: Alert & Oriented X3, Good concentration; Follows commands; SILT   HEENT: Head atraumatic, normocephalic; speech clear and fluent  GI: Appetite good, (+)BM  : Voiding   EXTREMITIES: No cyanosis or edema; moving all extremities except LLE pain limited  MSK: Motor Strength 4/5 B/L upper and lower extremities; (+) tenderness on spinal palpation; no muscle spasm; ambulates w/ walker or cane recently  SKIN: No rashes or lesions  PSYCH: affect normal; good eye contact; no signs of depression or anxiety    PAST MEDICAL & SURGICAL HISTORY:  HTN (hypertension)      HLD (hyperlipidemia)      DM2 (diabetes mellitus, type 2)      S/P cholecystectomy          FAMILY HISTORY:      Allergies    No Known Allergies      MEDICATIONS  (STANDING):  aspirin enteric coated 81 milliGRAM(s) Oral daily  atorvastatin 20 milliGRAM(s) Oral at bedtime  celecoxib 100 milliGRAM(s) Oral daily  gabapentin 100 milliGRAM(s) Oral daily  heparin   Injectable 5000 Unit(s) SubCutaneous every 12 hours  lisinopril 2.5 milliGRAM(s) Oral daily  methimazole 5 milliGRAM(s) Oral daily  senna 2 Tablet(s) Oral at bedtime  timolol 0.5% Solution 1 Drop(s) Both EYES two times a day    MEDICATIONS  (PRN):  cyclobenzaprine 5 milliGRAM(s) Oral two times a day PRN Muscle Spasm  oxycodone    5 mG/acetaminophen 325 mG 1 Tablet(s) Oral every 8 hours PRN Moderate Pain (4 - 6)      Vital Signs:  T(C): 36.8 (01-13-25 @ 15:25)  HR: 77 (01-13-25 @ 15:25)  BP: 155/71 (01-13-25 @ 15:25)  RR: 16 (01-13-25 @ 15:25)  SpO2: 97% (01-13-25 @ 15:25)    Pertinent labs/radiology:  Reviewed                          12.0   9.13  )-----------( 291      ( 13 Jan 2025 11:35 )             38.9       01-13    137  |  98  |  27[H]  ----------------------------<  158[H]  4.9   |  23  |  0.98    Ca    9.9      13 Jan 2025 11:35    TPro  7.2  /  Alb  3.9  /  TBili  0.4  /  DBili  x   /  AST  19  /  ALT  13  /  AlkPhos  83  01-13      < from: CT Lumbar Spine Reform No Cont (01.13.25 @ 12:41) >  IMPRESSION:  Chronic compression fracture of L1 with loss of 25% of   vertebral body height andminimal bony retropulsion.   Grade 1 anterior   spondylolisthesis at L5-S1 on a degenerative basis.   Mild to moderate   degenerative disc disease and spondylosis at T12-L1 through L5-S1 with   loss of disc height and associated degenerative endplatechanges. Bulges   are noted at L1-2 through L5-S1 which flatten the ventral thecal sac and   narrow the BILATERAL neural foramina. Mild central stenosis at L1-2 and   L2-3 and moderate central stenosis at L3-4, L4-5 and L5-S1 on a   degenerative basis.    --- End of Report ---    < end of copied text >  
Rebekah Kumar  84F DM, HTN, HLD, no hx cancer/ smoking, p/w acute on chronic LBP x1wk. No radiculopathy. No weakness. No b/b dysfunction. CT L spine w grade 1 anterolisthesis of L5 on S1 causing mild b/l foraminal stenosis. Also w/ L1 comp fracture. Exam:  AOx3, RAMIRES 5/5, SILT.

## 2025-01-13 NOTE — CONSULT NOTE ADULT - TIME BILLING
CT L-spine reviewed, demonstrates L1 compression fx and L4-5 spoindylolisthesis.  Patient DC home prior to my evaluation. Pt to F/U w/ me in office as outpatient.

## 2025-01-13 NOTE — ED ADULT NURSE NOTE - NSFALLRISKINTERV_ED_ALL_ED

## 2025-01-13 NOTE — H&P ADULT - NSHPLABSRESULTS_GEN_ALL_CORE
LABS:                        12.0   9.13  )-----------( 291      ( 13 Jan 2025 11:35 )             38.9     01-13    137  |  98  |  27[H]  ----------------------------<  158[H]  4.9   |  23  |  0.98    Ca    9.9      13 Jan 2025 11:35    TPro  7.2  /  Alb  3.9  /  TBili  0.4  /  DBili  x   /  AST  19  /  ALT  13  /  AlkPhos  83  01-13          Urinalysis Basic - ( 13 Jan 2025 11:35 )    Color: x / Appearance: x / SG: x / pH: x  Gluc: 158 mg/dL / Ketone: x  / Bili: x / Urobili: x   Blood: x / Protein: x / Nitrite: x   Leuk Esterase: x / RBC: x / WBC x   Sq Epi: x / Non Sq Epi: x / Bacteria: x

## 2025-01-13 NOTE — ED PROVIDER NOTE - CARE PLAN
Initiate Treatment: econazole 1 % topical cream \\nSig: Apply to scalp BID as needed\\n\\ngriseofulvin microsize 125 mg/5 mL oral suspension \\nSig: Take 9 mL PO BID Detail Level: Zone Continue Regimen: ketoconazole 2 % shampoo \\nSi application Q72H to scalp , leave on for five minutes and then rinse off 1 Principal Discharge DX:	Back pain

## 2025-01-13 NOTE — ED PROVIDER NOTE - PROGRESS NOTE DETAILS
Bell Chery PGY-1:  pts pain improved. xrays negative for fracture. pt was able to ambulate. discussed return precautions and follow up instructions with the pt. she understands and agrees to plan. Patient reassessed, states she still has some pain, will not be able to go home because she has pain with movement, spoke to son at bedside, would like to stay in the hospital for pain control and PT eval. Pt is Dr. Israel Lyon, [facility] Martinez. [facility]

## 2025-01-13 NOTE — H&P ADULT - HISTORY OF PRESENT ILLNESS
83yo female        hx DM, HTN, HLD, chronic back pain  has  seen by spine and pain management   and  lives with his son      presents with lower back pain x few days worse over the last day    son  unable to  take  care  of pt and  askinf  for  placement       she has chronic back pain, is taking gabapentin and meloxicam wo relief     . denies any trauma, numbness, tingling or weakness, change in bladder or bowel. States pain has been chronic now increased.       uses her walker or cane. denies any chest/abd pain, f/c. Pt took tylenol at 9am.      awake,alert,oriented x 3. Patient is well appearing and in no acute distress.

## 2025-01-13 NOTE — ED ADULT NURSE NOTE - OBJECTIVE STATEMENT
Patient BIBA from home, accompanied by son. Alert & oriented x4, Claimed having this pain for a while & under pain management. Placed on meds & usually helped with tylenol, gabapentin "not today" Constant sharp pain. Denies difficulty urinating & bm. Took 1 gm tylenol at 0900 but not helping. No recent injury.

## 2025-01-14 ENCOUNTER — TRANSCRIPTION ENCOUNTER (OUTPATIENT)
Age: 85
End: 2025-01-14

## 2025-01-14 VITALS
OXYGEN SATURATION: 97 % | SYSTOLIC BLOOD PRESSURE: 119 MMHG | RESPIRATION RATE: 18 BRPM | HEART RATE: 81 BPM | DIASTOLIC BLOOD PRESSURE: 73 MMHG | TEMPERATURE: 98 F

## 2025-01-14 LAB
CULTURE RESULTS: ABNORMAL
GLUCOSE BLDC GLUCOMTR-MCNC: 143 MG/DL — HIGH (ref 70–99)
GLUCOSE BLDC GLUCOMTR-MCNC: 210 MG/DL — HIGH (ref 70–99)
SPECIMEN SOURCE: SIGNIFICANT CHANGE UP

## 2025-01-14 PROCEDURE — 81001 URINALYSIS AUTO W/SCOPE: CPT

## 2025-01-14 PROCEDURE — 99285 EMERGENCY DEPT VISIT HI MDM: CPT

## 2025-01-14 PROCEDURE — 74176 CT ABD & PELVIS W/O CONTRAST: CPT | Mod: MC

## 2025-01-14 PROCEDURE — 97162 PT EVAL MOD COMPLEX 30 MIN: CPT

## 2025-01-14 PROCEDURE — 87086 URINE CULTURE/COLONY COUNT: CPT

## 2025-01-14 PROCEDURE — 82962 GLUCOSE BLOOD TEST: CPT

## 2025-01-14 PROCEDURE — 87077 CULTURE AEROBIC IDENTIFY: CPT

## 2025-01-14 PROCEDURE — 80053 COMPREHEN METABOLIC PANEL: CPT

## 2025-01-14 PROCEDURE — 85025 COMPLETE CBC W/AUTO DIFF WBC: CPT

## 2025-01-14 RX ORDER — NALOXONE HCL 0.4 MG/ML
1 VIAL (ML) INJECTION
Qty: 1 | Refills: 0
Start: 2025-01-14 | End: 2025-01-14

## 2025-01-14 RX ORDER — CELECOXIB 200 MG
1 CAPSULE ORAL
Qty: 7 | Refills: 0
Start: 2025-01-14 | End: 2025-01-20

## 2025-01-14 RX ORDER — PANTOPRAZOLE 40 MG/1
40 TABLET, DELAYED RELEASE ORAL
Refills: 0 | Status: DISCONTINUED | OUTPATIENT
Start: 2025-01-14 | End: 2025-01-14

## 2025-01-14 RX ORDER — CELECOXIB 200 MG
100 CAPSULE ORAL DAILY
Refills: 0 | Status: DISCONTINUED | OUTPATIENT
Start: 2025-01-14 | End: 2025-01-14

## 2025-01-14 RX ORDER — GABAPENTIN 300 MG/1
1 CAPSULE ORAL
Qty: 20 | Refills: 0
Start: 2025-01-14 | End: 2025-01-23

## 2025-01-14 RX ORDER — SENNOSIDES 8.6 MG/1
2 TABLET, FILM COATED ORAL
Qty: 60 | Refills: 0
Start: 2025-01-14 | End: 2025-02-12

## 2025-01-14 RX ORDER — PANTOPRAZOLE 40 MG/1
1 TABLET, DELAYED RELEASE ORAL
Qty: 30 | Refills: 0
Start: 2025-01-14 | End: 2025-02-12

## 2025-01-14 RX ORDER — ACETAMINOPHEN 80 MG/.8ML
2 SOLUTION/ DROPS ORAL
Qty: 0 | Refills: 0 | DISCHARGE
Start: 2025-01-14 | End: 2025-01-24

## 2025-01-14 RX ADMIN — HEPARIN SODIUM 5000 UNIT(S): 1000 INJECTION, SOLUTION INTRAVENOUS; SUBCUTANEOUS at 06:20

## 2025-01-14 RX ADMIN — ACETAMINOPHEN 650 MILLIGRAM(S): 80 SOLUTION/ DROPS ORAL at 06:20

## 2025-01-14 RX ADMIN — Medication 1 DROP(S): at 06:19

## 2025-01-14 RX ADMIN — Medication 100 MILLIGRAM(S): at 12:13

## 2025-01-14 RX ADMIN — LIDOCAINE 1 PATCH: 50 OINTMENT TOPICAL at 12:45

## 2025-01-14 RX ADMIN — ACETAMINOPHEN 650 MILLIGRAM(S): 80 SOLUTION/ DROPS ORAL at 13:25

## 2025-01-14 RX ADMIN — Medication 81 MILLIGRAM(S): at 12:13

## 2025-01-14 RX ADMIN — ACETAMINOPHEN 650 MILLIGRAM(S): 80 SOLUTION/ DROPS ORAL at 06:59

## 2025-01-14 RX ADMIN — ACETAMINOPHEN 650 MILLIGRAM(S): 80 SOLUTION/ DROPS ORAL at 13:32

## 2025-01-14 RX ADMIN — LISINOPRIL 2.5 MILLIGRAM(S): 30 TABLET ORAL at 06:20

## 2025-01-14 RX ADMIN — Medication 100 MILLIGRAM(S): at 13:04

## 2025-01-14 RX ADMIN — GABAPENTIN 300 MILLIGRAM(S): 300 CAPSULE ORAL at 06:20

## 2025-01-14 RX ADMIN — LIDOCAINE 1 PATCH: 50 OINTMENT TOPICAL at 08:01

## 2025-01-14 RX ADMIN — PANTOPRAZOLE 40 MILLIGRAM(S): 40 TABLET, DELAYED RELEASE ORAL at 12:15

## 2025-01-14 NOTE — DISCHARGE NOTE PROVIDER - CARE PROVIDER_API CALL
Israel Lyon  Cardiovascular Disease  [address] [place] [address]  Phone: [phone]  Fax: [phone]  Follow Up Time:    Israel Lyon  Cardiovascular Disease  [address] [place] [id number]  Phone: [phone]  Fax: [phone]  Follow Up Time:     Tonny Thakur  [facility]  [address] [facility], [address]  [place] [address]  Phone: [phone]  Fax: [phone]  Follow Up Time: 1 week    Aj Gallegos  Physical/Rehab Medicine  [address] [place] [address]  Phone: [phone]  Fax: [phone]  Follow Up Time: 1 week

## 2025-01-14 NOTE — PHYSICAL THERAPY INITIAL EVALUATION ADULT - PERTINENT HX OF CURRENT PROBLEM, REHAB EVAL
Pt is 84F admitted 1/13/25 PMHx  DM, HTN, HLD, chronic back pain  has  seen by spine and pain management   and  lives with his son; presents with lower back pain x few days worse over the last day.       CT L/S: Chronic compression fracture of L1 with loss of 25% of vertebral body height and minimal bony retropulsion.   Grade 1 anterior spondylolisthesis at L5-S1 on a degenerative basis.   Mild to moderate degenerative disc disease and spondylosis at T12-L1 through L5-S1 with loss of disc height and associated degenerative endplate changes. Bulges are noted at L1-2 through L5-S1 which flatten the ventral thecal sac and narrow the BILATERAL neural foramina. Mild central stenosis at L1-2 and L2-3 and moderate central stenosis at L3-4, L4-5 and L5-S1 on a degenerative basis. CT ABDOMEN/PELVIS: No acute pathology.

## 2025-01-14 NOTE — PROGRESS NOTE ADULT - SUBJECTIVE AND OBJECTIVE BOX
date of service: 25 @ 09:50  [facility]  REVIEW OF SYSTEMS:  CONSTITUTIONAL: No fever,  no  weight loss  ENT:  No  tinnitus,   no   vertigo  NECK: No pain or stiffness  RESPIRATORY: No cough, wheezing, chills or hemoptysis;    No Shortness of Breath  CARDIOVASCULAR: No chest pain, palpitations, dizziness  GASTROINTESTINAL: No abdominal or epigastric pain. No nausea, vomiting, or hematemesis; No diarrhea  No melena or hematochezia.  GENITOURINARY: No dysuria, frequency, hematuria, or incontinence  NEUROLOGICAL: No headaches  SKIN: No itching,  no   rash  LYMPH Nodes: No enlarged glands  ENDOCRINE: No heat or cold intolerance  MUSCULOSKELETAL: No joint pain or swelling  PSYCHIATRIC: No depression, anxiety  HEME/LYMPH: No easy bruising, or bleeding gums  ALLERGY AND IMMUNOLOGIC: No hives or eczema	    MEDICATIONS  (STANDING):  acetaminophen     Tablet .. 650 milliGRAM(s) Oral every 8 hours  aspirin enteric coated 81 milliGRAM(s) Oral daily  atorvastatin 20 milliGRAM(s) Oral at bedtime  celecoxib 100 milliGRAM(s) Oral daily  gabapentin 300 milliGRAM(s) Oral two times a day  heparin   Injectable 5000 Unit(s) SubCutaneous every 12 hours  lidocaine   4% Patch 1 Patch Transdermal daily  lisinopril 2.5 milliGRAM(s) Oral daily  methimazole 5 milliGRAM(s) Oral daily  senna 2 Tablet(s) Oral at bedtime  timolol 0.5% Solution 1 Drop(s) Both EYES two times a day    MEDICATIONS  (PRN):      Vital Signs Last 24 Hrs  T(C): 36.5 (2025 04:38), Max: 36.9 (2025 11:11)  T(F): 97.7 (2025 04:38), Max: 98.4 (2025 11:11)  HR: 77 (2025 09:20) (77 - 81)  BP: 143/80 (2025 09:20) (135/84 - 168/87)  BP(mean): 104 (2025 19:45) (104 - 104)  RR: 18 (2025 04:38) (16 - 18)  SpO2: 96% (2025 09:20) (96% - 99%)    Parameters below as of 2025 09:20  Patient On (Oxygen Delivery Method): room air      CAPILLARY BLOOD GLUCOSE      POCT Blood Glucose.: 143 mg/dL (2025 08:16)    I&O's Summary        Appearance: Normal	  HEENT:   Normal oral mucosa, PERRL, EOMI	  Lymphatic: No lymphadenopathy  Cardiovascular: Normal S1 S2, No JVD  Respiratory: Lungs clear to auscultation	  Gastrointestinal:  Soft, Non-tender, + BS	  Skin: No rash, No ecchymoses	  Extremities:     LABS:                        12.0   9.13  )-----------( 291      ( 2025 11:35 )             38.9         137  |  98  |  27[H]  ----------------------------<  158[H]  4.9   |  23  |  0.98    Ca    9.9      2025 11:35    TPro  7.2  /  Alb  3.9  /  TBili  0.4  /  DBili  x   /  AST  19  /  ALT  13  /  AlkPhos  83  -13          Urinalysis Basic - ( 2025 16:03 )    Color: Yellow / Appearance: Clear / S.014 / pH: x  Gluc: x / Ketone: Negative mg/dL  / Bili: Negative / Urobili: 0.2 mg/dL   Blood: x / Protein: Negative mg/dL / Nitrite: Negative   Leuk Esterase: Negative / RBC: 1 /HPF / WBC 1 /HPF   Sq Epi: x / Non Sq Epi: 0 /HPF / Bacteria: Negative /HPF                      Consultant(s) Notes Reviewed:      Care Discussed with Consultants/Other Providers:

## 2025-01-14 NOTE — PATIENT PROFILE ADULT - FALL HARM RISK - FACTORS
AMBULATORY CASE MANAGEMENT NOTE    Name and Relationship of Patient/Support Person: Lebron: Community [employer] - Other    Care Coordination    Spoke with Lebron with the Community [employer]aiver [employer], stated patient would need to apply for medicaid to be eligible for the program and that the patient stated she would like to think about it and talk with her daughter.        Siomara ROCKWELL  Ambulatory Case Management    5/24/2023, 09:12 EDT   Impaired gait/Weakness

## 2025-01-14 NOTE — PHYSICAL THERAPY INITIAL EVALUATION ADULT - GENERAL OBSERVATIONS, REHAB EVAL
received semisupine in bed, A&Ox4, following commands, pleasant & eager to participate, a/w back pain, L1 compression fx, no intervention as per neurosurgery

## 2025-01-14 NOTE — DISCHARGE NOTE PROVIDER - NSDCFUADDAPPT_GEN_ALL_CORE_FT
APPTS ARE READY TO BE MADE: [X] YES    Best Family or Patient Contact (if needed): Patient's son.

## 2025-01-14 NOTE — DISCHARGE NOTE PROVIDER - NSDCCPCAREPLAN_GEN_ALL_CORE_FT
PRINCIPAL DISCHARGE DIAGNOSIS  Diagnosis: Back pain  Assessment and Plan of Treatment: Take pain medications as prescribed.  Follow up with Pain Management MD Dr Rivas and Neurosurgeon Dr Thakur in 1 week.      SECONDARY DISCHARGE DIAGNOSES  Diagnosis: Diabetes mellitus  Assessment and Plan of Treatment:   Make sure you get your  Hemoglobin A1C (HgA1c)  checked every three months.  If you take oral diabetes medications, check your blood glucose two times a day.  If you take insulin, check your blood glucose before meals and at bedtime.  It's important not to skip any meals.  Keep a log of your blood glucose results and always take it with you to your doctor appointments.  Keep a list of your current medications including injectables and over the counter medications and bring this medication list with you to all your doctor appointments.  If you have not seen your ophthalmologist this year call for appointment.  Check your feet daily for redness, sores, or openings. Do not self treat. If no improvement in two days call your primary care physician for an appointment.      Diagnosis: Hypertension  Assessment and Plan of Treatment: Take your medication as prescribed.  Follow up with your medical doctor for routine blood pressure monitoring, and to establish long term blood pressure treatment goals.  Low salt diet  Activity as tolerated.  Notify your doctor if you have any of the following symptoms:   Dizziness, Lightheadedness, Blurry vision, Headache, Chest pain, Shortness of breath      Diagnosis: Hyperlipidemia  Assessment and Plan of Treatment: Take your medication as prescribed.   Follow up with your medical doctor for routine blood  work monitoring, and to establish long term treatment goals.

## 2025-01-14 NOTE — PROGRESS NOTE ADULT - ASSESSMENT
83yo female        hx DM, HTN, HLD, chronic back pain  has  seen by spine and pain management   and  lives with his son      presents with lower back pain x few days worse over the last day    son  unable to  take  care  of pt and  askinf  for  placement       she has chronic back pain, is taking gabapentin and meloxicam wo relief     . denies any trauma, numbness, tingling or weakness, change in bladder or bowel. States pain has been chronic now increased.       uses her walker or cane. denies any chest/abd pain, f/c. Pt took tylenol at 9am.      awake ,alert,oriented x 3. Patient is well appearing and in no acute distress.          c/c  back pain. on  pain meds          bit  wosre  at times/  CT spine,  c/c  comp L 1 fx. oA  soine         spine  eval    PT  eval   HTN/ HLD    DM    follow  fs     seen by  N/S,  no  intervntion/  meds  pe r pain team    dvt ppx. PT eval   start   d/c  plans      pt  is  full c ode       ad< from: CT Lumbar Spine Reform No Cont (01.13.25 @ 12:41) >  IMPRESSION:  Chronic compression fracture of L1 with loss of 25% of   vertebral body height andminimal bony retropulsion.   Grade 1 anterior   spondylolisthesis at L5-S1 on a degenerative basis.   Mild to moderate   degenerative disc disease and spondylosis at T12-L1 through L5-S1 with   loss of disc height and associated degenerative endplatechanges. Bulges   are noted at L1-2 through L5-S1 which flatten the ventral thecal sac and   narrow the BILATERAL neural foramina. Mild central stenosis at L1-2 and   L2-3 and moderate central stenosis at L3-4, L4-5 and L5-S1 on a   degenerative basis.  --- End of Report ---              <

## 2025-01-14 NOTE — DISCHARGE NOTE PROVIDER - PROVIDER TOKENS
PROVIDER:[TOKEN:[[id number]]] PROVIDER:[TOKEN:[[id number]]],PROVIDER:[TOKEN:[[id number]],FOLLOWUP:[1 week]],PROVIDER:[TOKEN:[[id number]],FOLLOWUP:[1 week]]

## 2025-01-14 NOTE — DISCHARGE NOTE PROVIDER - HOSPITAL COURSE
83yo female        hx DM, HTN, HLD, chronic back pain  has  seen by spine and pain management   and  lives with his son      presents with lower back pain x few days worse over the last day    son  unable to  take  care  of pt and  askinf  for  placement       she has chronic back pain, is taking gabapentin and meloxicam wo relief     . denies any trauma, numbness, tingling or weakness, change in bladder or bowel. States pain has been chronic now increased.       uses her walker or cane. denies any chest/abd pain, f/c. Pt took tylenol at 9am.      awake ,alert,oriented x 3. Patient is well appearing and in no acute distress.          c/c  back pain. /  from comp fx.  chronic, on  pain meds          bit  wosre  at times/  CT spine,  c/c  comp L 1 fx. oA  soine         spine  eval, no intervention    PT  eval   HTN/ HLD    DM    follow  fs     seen by  N/S,  no  intervntion/  meds  pe r pain team    dvt ppx. PT eval   start   d/c  plans/  f/p pain dr/  pmd      pt  is  full c ode   85yo female        hx DM, HTN, HLD, chronic back pain  has  seen by spine and pain management   and  lives with his son      presents with lower back pain x few days worse over the last day    son  unable to  take  care  of pt and  askinf  for  placement       she has chronic back pain, is taking gabapentin and meloxicam wo relief     . denies any trauma, numbness, tingling or weakness, change in bladder or bowel. States pain has been chronic now increased.       uses her walker or cane. denies any chest/abd pain, f/c. Pt took tylenol at 9am.      awake ,alert,oriented x 3. Patient is well appearing and in no acute distress.          c/c  back pain. /  from comp fx.  chronic, on  pain meds          bit  wosre  at times/  CT spine,  c/c  comp L 1 fx. oA  soine         spine  eval, no intervention    PT  eval   HTN/ HLD    DM    follow  fs     seen by  N/S,  no  intervntion/  meds  pe r pain team    dvt ppx. PT eval   start   d/c  plans/  f/p pain dr/  pmd      pt  is  full c ode    Pt medically stable for discharge home today per Med Attending Dr Martinez.

## 2025-01-14 NOTE — PHYSICAL THERAPY INITIAL EVALUATION ADULT - PLANNED THERAPY INTERVENTIONS, PT EVAL
What Type Of Note Output Would You Prefer (Optional)?: Bullet Format GOALS: Pt will negotiate 6 steps with handrail & step to pattern with supervision in 4wks/bed mobility training/gait training/transfer training

## 2025-01-14 NOTE — PHYSICAL THERAPY INITIAL EVALUATION ADULT - GAIT DEVIATIONS NOTED, PT EVAL
decreased alida/increased time in double stance/decreased velocity of limb motion/decreased step length/decreased stride length/decreased weight-shifting ability

## 2025-01-14 NOTE — DISCHARGE NOTE PROVIDER - NSDCMRMEDTOKEN_GEN_ALL_CORE_FT
aspirin 81 mg oral delayed release tablet: 1 tab(s) orally once a day  cyclobenzaprine 5 mg oral tablet: 1 tab(s) orally 2 times a day, As needed, Muscle Spasm  gabapentin 100 mg oral capsule: orally once a day  Januvia 100 mg oral tablet: 0.5 cap(s) orally once a day  Keflex 500 mg oral capsule: 1 cap(s) orally 2 times a day   lidocaine 5% topical film: Apply topically to affected area once a day . May use OTC substitute if not covered by insurance.   lisinopril 2.5 mg oral tablet: 1 tab(s) orally once a day  meclizine 12.5 mg oral tablet: 1 tab(s) orally every 8 hours as needed for  dizziness  metFORMIN 1000 mg oral tablet: 1 tab(s) orally 2 times a day  methIMAzole 5 mg oral tablet: 1 tab(s) orally once a day  oxyCODONE-acetaminophen 5 mg-325 mg oral tablet: 1 tab(s) orally once a day, As Needed -Moderate Pain (4 - 6) MDD:1  Rolling walker (5 inch wheels). Use as directed. Dx: low back pain. ICD10: M54.5:   simvastatin 40 mg oral tablet: 1 tab(s) orally once a day (at bedtime)  timolol maleate 0.5% ophthalmic solution: 1 drop(s) to each affected eye 2 times a day   acetaminophen 325 mg oral tablet: 2 tab(s) orally every 8 hours GIVE EVERY 8 HOURS AROUND THE CLOCK  aspirin 81 mg oral delayed release tablet: 1 tab(s) orally once a day  celecoxib 100 mg oral capsule: 1 cap(s) orally once a day  gabapentin 300 mg oral capsule: 1 cap(s) orally 2 times a day  Januvia 100 mg oral tablet: 0.5 cap(s) orally once a day  lidocaine 5% topical film: Apply topically to affected area once a day . May use OTC substitute if not covered by insurance.   lisinopril 2.5 mg oral tablet: 1 tab(s) orally once a day  meclizine 12.5 mg oral tablet: 1 tab(s) orally every 8 hours as needed for  dizziness  metFORMIN 1000 mg oral tablet: 1 tab(s) orally 2 times a day  methIMAzole 5 mg oral tablet: 1 tab(s) orally once a day  Narcan 4 mg/0.1 mL nasal spray: 1 spray(s) intranasally once as needed for  narcotic overdose 1 spray to each nostril  pantoprazole 40 mg oral delayed release tablet: 1 tab(s) orally once a day (before a meal)  Rolling walker (5 inch wheels). Use as directed. Dx: low back pain. ICD10: M54.5:   senna leaf extract oral tablet: 2 tab(s) orally once a day (at bedtime) HOLD FOR LOOSE STOOLS  simvastatin 40 mg oral tablet: 1 tab(s) orally once a day (at bedtime)  timolol maleate 0.5% ophthalmic solution: 1 drop(s) to each affected eye 2 times a day

## 2025-01-14 NOTE — PATIENT PROFILE ADULT - FALL HARM RISK - RISK INTERVENTIONS

## 2025-01-14 NOTE — DISCHARGE NOTE PROVIDER - CARE PROVIDERS DIRECT ADDRESSES
,DirectAddress_Unknown ,DirectAddress_Unknown,[email or web address],DirectAddress_Unknown

## 2025-01-14 NOTE — DISCHARGE NOTE NURSING/CASE MANAGEMENT/SOCIAL WORK - PATIENT PORTAL LINK FT
You can access the FollowMyHealth Patient Portal offered by [facility] by registering at the following website: [email or web address]. By joining [employer]’s FollowMyHealth portal, you will also be able to view your health information using other applications (apps) compatible with our system.

## 2025-01-14 NOTE — DISCHARGE NOTE NURSING/CASE MANAGEMENT/SOCIAL WORK - NSDCPEFALRISK_GEN_ALL_CORE
For information on Fall & Injury Prevention, visit: [email or web address] OR  [email or web address] OR  https://www.cdc.gov/steadi/patient.html

## 2025-05-19 ENCOUNTER — NON-APPOINTMENT (OUTPATIENT)
Age: 85
End: 2025-05-19

## 2025-05-19 ENCOUNTER — APPOINTMENT (OUTPATIENT)
Age: 85
End: 2025-05-19
Payer: MEDICARE

## 2025-05-19 PROCEDURE — ZZZZZ: CPT

## 2025-05-19 PROCEDURE — 92012 INTRM OPH EXAM EST PATIENT: CPT

## 2025-05-19 PROCEDURE — 92250 FUNDUS PHOTOGRAPHY W/I&R: CPT

## 2025-05-19 PROCEDURE — 92083 EXTENDED VISUAL FIELD XM: CPT

## 2025-05-21 NOTE — PATIENT PROFILE ADULT. - NS PRO CONTRA FLU 1
Patient needs a refill of metoprolol succinate (TOPROL XL) 100 MG extended.       [facility]     Pharmacy Contact    Telephone Fax   [phone]     Pharmacy Address and Hours    Address Hours   [address]  [place] [facility] [address]       out of season (available sept 1 thru apr 2 only)